# Patient Record
Sex: FEMALE | Race: WHITE | NOT HISPANIC OR LATINO | Employment: FULL TIME | URBAN - METROPOLITAN AREA
[De-identification: names, ages, dates, MRNs, and addresses within clinical notes are randomized per-mention and may not be internally consistent; named-entity substitution may affect disease eponyms.]

---

## 2018-05-09 ENCOUNTER — OFFICE VISIT (OUTPATIENT)
Dept: SLEEP MEDICINE | Facility: CLINIC | Age: 28
End: 2018-05-09
Payer: COMMERCIAL

## 2018-05-09 VITALS
HEIGHT: 66 IN | WEIGHT: 137 LBS | OXYGEN SATURATION: 97 % | RESPIRATION RATE: 16 BRPM | BODY MASS INDEX: 22.02 KG/M2 | HEART RATE: 71 BPM | DIASTOLIC BLOOD PRESSURE: 75 MMHG | SYSTOLIC BLOOD PRESSURE: 114 MMHG

## 2018-05-09 DIAGNOSIS — R06.83 SNORING: ICD-10-CM

## 2018-05-09 DIAGNOSIS — R06.81 WITNESSED APNEIC SPELLS: ICD-10-CM

## 2018-05-09 DIAGNOSIS — R29.818 SUSPECTED SLEEP APNEA: Primary | ICD-10-CM

## 2018-05-09 PROCEDURE — 99203 OFFICE O/P NEW LOW 30 MIN: CPT | Performed by: INTERNAL MEDICINE

## 2018-05-09 NOTE — PROGRESS NOTES
Visit Date:   05/09/2018      SLEEP EVALUATION       CHIEF COMPLAINT:  Snoring, witnessed apneas.      HISTORY OF PRESENT ILLNESS:  Ms. Christianson is a 27-year-old female who presents to clinic today with concerns about possible sleep apnea.  She has a long history of snoring.  However, in the last 6 months, her boyfriend of last 7 years has commented on stopping of breathing episodes that he has witnessed in her sleep.  Apparently on 1 night, he noticed 3 consecutive episodes in a short period of time, which made him significantly concerned.  He woke her up from sleep to terminate these episodes.  The patient herself has not been aware of apneas.  She has occasionally experienced a snort arousal but denies being aware of gasping or choking events.  She snores every day and snoring is reported to be loud.  They never sleep separately.  She complains of having frequent dry mouth.  She denies having significant difficulty breathing through her nose.  She denies having morning headaches.  She sleeps in all positions including her back and her sides.      The patient goes to bed between 10:00 and 11:00 p.m.  She can fall asleep within 10 minutes.  She estimates waking up once in the night for uncertain reasons or to have a drink of water.  She can usually return to sleep within 5 minutes.  In the morning, she wakes up at 7:00 a.m.  She denies having significant tiredness at this time.  On weekends, the patient goes to bed by 11:00 p.m. to midnight.  She may wake up between 9:00 and 10:00 a.m.  On an average, she manages 8 hours of sleep.      The patient denies having significant daytime sleepiness or fatigue.  She rated her [place] Sleepiness Scale Score at 1/24.  She denies having any significant sleepiness while driving.      The patient denies having restless leg symptoms.  On one occasion, she was seen to walk to the TV in her sleep and try to switch it on.  There are no other episodes of dream enactment or  parasomnias.      The patient drinks 2 cups of coffee in the morning.  She drinks alcohol fairly regularly, about 2-3 drinks in a night.  She has smoked since age 12 and averages 3/4 of a pack daily.      PAST MEDICAL HISTORY:  Nothing significant.      FAMILY HISTORY:  Both her parents snore loudly.      SOCIAL HISTORY:  She works as a .  She lives with her fiance.  She has a history of smoking since age 12.      REVIEW OF SYSTEMS:  A complete review of systems was negative.      PHYSICAL EXAMINATION:   CONSTITUTIONAL:  Awake, alert, cooperative, in no apparent distress.   EYES:  No icterus, normal conjunctivae, PERRLA.   ENT:  Oropharynx is Elliott class IV with elongated soft palate and narrow diameters.   CARDIOVASCULAR:  Regular S1 and S2.   PULMONARY:  Chest is symmetric.  Lungs clear bilaterally.   NEUROLOGIC:  Alert and oriented x3, no focal neurological deficit.  Cranial nerves are grossly normal exam.   PSYCHIATRIC:  Mood is euthymic with a congruent affect.  Attention and concentration are normal.      ASSESSMENT:   1.  Rule out obstructive sleep apnea.   2.  Snoring.   3.  Witnessed apneas.      The patient is a 27-year-old female with a BMI of 22 and neck circumference of 33 cm, who has become concerned about sleep apnea since her boyfriend witnessed apneas in her sleep.  She has a crowded oropharynx and has a history of frequent snoring.  She denies other subjective complaints regarding her sleep quality or daytime function.  History of witnessed apneas is concerning and raises the possibility of sleep disordered breathing.  However, pretest probability for moderate or severe sleep apnea is low.  I have recommended overnight sleep testing to assess for sleep disordered breathing and its severity.  Considering low pretest probability for moderate or severe sleep apnea, an in-lab polysomnogram will be the ideal option.      TREATMENT PLAN:   1.  Split night polysomnogram for evaluation  of sleep apnea.   2.  Follow up after sleep study for further recommendations.      I spent a total of 30 minutes with this patient, with more than 50% spent in counseling.         REGINO KOEHLER MD             D: 2018   T: 2018   MT: OSEI      Name:     ASHLEY GILLILAND   MRN:      [id number]        Account:      [id number]   :      1990           Visit Date:   2018      Document: [id number]

## 2018-05-09 NOTE — MR AVS SNAPSHOT
After Visit Summary   5/9/2018    Maxine Christianson    MRN: [id number]           Patient Information     Date Of Birth          1990        Visit Information        Provider Department      5/9/2018 2:00 PM Salazar Faust MD [facility]        Today's Diagnoses     Suspected sleep apnea    -  1    Snoring        Witnessed apneic spells          Care Instructions      Your BMI is Body mass index is 22.11 kg/(m^2).  Weight management is a personal decision.  If you are interested in exploring weight loss strategies, the following discussion covers the approaches that may be successful. Body mass index (BMI) is one way to tell whether you are at a healthy weight, overweight, or obese. It measures your weight in relation to your height.  A BMI of 18.5 to 24.9 is in the healthy range. A person with a BMI of 25 to 29.9 is considered overweight, and someone with a BMI of 30 or greater is considered obese. More than two-thirds of American adults are considered overweight or obese.  Being overweight or obese increases the risk for further weight gain. Excess weight may lead to heart disease and diabetes.  Creating and following plans for healthy eating and physical activity may help you improve your health.  Weight control is part of healthy lifestyle and includes exercise, emotional health, and healthy eating habits. Careful eating habits lifelong are the mainstay of weight control. Though there are significant health benefits from weight loss, long-term weight loss with diet alone may be very difficult to achieve- studies show long-term success with dietary management in less than 10% of people. Attaining a healthy weight may be especially difficult to achieve in those with severe obesity. In some cases, medications, devices and surgical management might be considered.  What can you do?  If you are overweight or obese and are interested in methods for weight loss, you should discuss  this with your provider.     Consider reducing daily calorie intake by 500 calories.     Keep a food journal.     Avoiding skipping meals, consider cutting portions instead.    Diet combined with exercise helps maintain muscle while optimizing fat loss. Strength training is particularly important for building and maintaining muscle mass. Exercise helps reduce stress, increase energy, and improves fitness. Increasing exercise without diet control, however, may not burn enough calories to loose weight.       Start walking three days a week 10-20 minutes at a time    Work towards walking thirty minutes five days a week     Eventually, increase the speed of your walking for 1-2 minutes at time    In addition, we recommend that you review healthy lifestyles and methods for weight loss available through the National Institutes of Health patient information sites:  http://win.niddk.nih.gov/publications/index.htm    And look into health and wellness programs that may be available through your health insurance provider, employer, local community center, or stef club.                  Follow-ups after your visit        Future tests that were ordered for you today     Open Future Orders        Priority Expected Expires Ordered    Comprehensive Sleep Study Routine  11/5/2018 5/9/2018            Who to contact     If you have questions or need follow up information about today's clinic visit or your schedule please contact [facility] directly at [phone].  Normal or non-critical lab and imaging results will be communicated to you by MyChart, letter or phone within 4 business days after the clinic has received the results. If you do not hear from us within 7 days, please contact the clinic through MyChart or phone. If you have a critical or abnormal lab result, we will notify you by phone as soon as possible.  Submit refill requests through [employer] or call your pharmacy and they will forward the refill  "request to us. Please allow 3 business days for your refill to be completed.          Additional Information About Your Visit        MyChart Information     [facility] lets you send messages to your doctor, view your test results, renew your prescriptions, schedule appointments and more. To sign up, go to [email or web address] beds [facility] the [email or web address] . Click on \"Log in\" on the left side of the screen, which will take you to the Welcome page. Then click on \"Sign up Now\" on the right side of the page.     You will be asked to enter the access code listed below, as well as some personal information. Please follow the directions to create your username and password.     Your access code is: [email or web address]  Expires: 2018  2:27 PM     Your access code will  in 90 days. If you need help or a new code, please call your [place] clinic or [phone].        Care EveryWhere ID     This is your Care EveryWhere ID. This could be used by other organizations to access your [place] medical records  [id number]        Your Vitals Were     Pulse Respirations Height Pulse Oximetry BMI (Body Mass Index)       71 16 1.676 m (5' 6\") 97% 22.11 kg/m2        Blood Pressure from Last 3 Encounters:   18 114/75    Weight from Last 3 Encounters:   18 62.1 kg (137 lb)               Primary Care Provider Fax #    Physician No Ref-Primary [phone]       No address on file        Equal Access to Services     DINA SANTOS : Hadii madison cruzo Soedilali, waaxda luqadaha, qaybta kaalmada adeegyada, carlos cline. So [facility] [phone].    ATENCIÓN: Si habla español, tiene a pino disposición servicios gratuitos de asistencia lingüística. Llame al [phone].    We comply with applicable federal civil rights laws and [place] laws. We do not discriminate on the basis of race, color, national origin, age, disability, sex, sexual orientation, or gender identity.            Thank you!     Thank you for choosing [facility] (acquired by [employer] in 2014) " [facility]  for your care. Our goal is always to provide you with excellent care. Hearing back from our patients is one way we can continue to improve our services. Please take a few minutes to complete the written survey that you may receive in the mail after your visit with us. Thank you!             Your Updated Medication List - Protect others around you: Learn how to safely use, store and throw away your medicines at www.disposemymeds.org.          This list is accurate as of 5/9/18  2:27 PM.  Always use your most recent med list.                   Brand Name Dispense Instructions for use Diagnosis    SPIRONOLACTONE PO      Take by mouth daily

## 2024-05-24 ENCOUNTER — HOSPITAL ENCOUNTER (OUTPATIENT)
Facility: CLINIC | Age: 34
Setting detail: OBSERVATION
Discharge: HOME | End: 2024-05-26
Attending: STUDENT IN AN ORGANIZED HEALTH CARE EDUCATION/TRAINING PROGRAM | Admitting: STUDENT IN AN ORGANIZED HEALTH CARE EDUCATION/TRAINING PROGRAM
Payer: COMMERCIAL

## 2024-05-24 ENCOUNTER — APPOINTMENT (OUTPATIENT)
Dept: ULTRASOUND IMAGING | Facility: CLINIC | Age: 34
End: 2024-05-24
Attending: STUDENT IN AN ORGANIZED HEALTH CARE EDUCATION/TRAINING PROGRAM
Payer: COMMERCIAL

## 2024-05-24 ENCOUNTER — APPOINTMENT (OUTPATIENT)
Dept: CT IMAGING | Facility: CLINIC | Age: 34
End: 2024-05-24
Attending: STUDENT IN AN ORGANIZED HEALTH CARE EDUCATION/TRAINING PROGRAM
Payer: COMMERCIAL

## 2024-05-24 DIAGNOSIS — F10.930 ALCOHOL WITHDRAWAL SYNDROME WITHOUT COMPLICATION (H): ICD-10-CM

## 2024-05-24 DIAGNOSIS — R74.8 ALKALINE PHOSPHATASE ELEVATION: ICD-10-CM

## 2024-05-24 DIAGNOSIS — F10.230 ALCOHOL DEPENDENCE WITH UNCOMPLICATED WITHDRAWAL (H): ICD-10-CM

## 2024-05-24 DIAGNOSIS — E87.29 STARVATION KETOACIDOSIS: ICD-10-CM

## 2024-05-24 DIAGNOSIS — D72.829 LEUKOCYTOSIS, UNSPECIFIED TYPE: ICD-10-CM

## 2024-05-24 DIAGNOSIS — E87.29 ALCOHOLIC KETOACIDOSIS: ICD-10-CM

## 2024-05-24 DIAGNOSIS — E80.6 HYPERBILIRUBINEMIA: ICD-10-CM

## 2024-05-24 DIAGNOSIS — K76.0 HEPATIC STEATOSIS: ICD-10-CM

## 2024-05-24 DIAGNOSIS — F41.9 ANXIETY: Primary | ICD-10-CM

## 2024-05-24 DIAGNOSIS — R74.01 TRANSAMINITIS: ICD-10-CM

## 2024-05-24 DIAGNOSIS — T73.0XXA STARVATION KETOACIDOSIS: ICD-10-CM

## 2024-05-24 DIAGNOSIS — E80.7 DISORDER OF BILIRUBIN METABOLISM, UNSPECIFIED: ICD-10-CM

## 2024-05-24 DIAGNOSIS — F10.20 SEVERE ALCOHOL USE DISORDER (H): ICD-10-CM

## 2024-05-24 LAB
ALBUMIN SERPL BCG-MCNC: 4.4 G/DL (ref 3.5–5.2)
ALBUMIN UR-MCNC: 100 MG/DL
ALP SERPL-CCNC: 196 U/L (ref 40–150)
ALT SERPL W P-5'-P-CCNC: 100 U/L (ref 0–50)
AMPHETAMINES UR QL SCN: ABNORMAL
ANION GAP SERPL CALCULATED.3IONS-SCNC: 17 MMOL/L (ref 7–15)
APPEARANCE UR: ABNORMAL
APTT PPP: 34 SECONDS (ref 22–38)
AST SERPL W P-5'-P-CCNC: 277 U/L (ref 0–45)
B-OH-BUTYR SERPL-SCNC: 1.3 MMOL/L
BARBITURATES UR QL SCN: ABNORMAL
BASE EXCESS BLDV CALC-SCNC: 3.2 MMOL/L (ref -3–3)
BASOPHILS # BLD AUTO: 0.1 10E3/UL (ref 0–0.2)
BASOPHILS NFR BLD AUTO: 1 %
BENZODIAZ UR QL SCN: ABNORMAL
BILIRUB DIRECT SERPL-MCNC: 1.54 MG/DL (ref 0–0.3)
BILIRUB SERPL-MCNC: 2.8 MG/DL
BILIRUB UR QL STRIP: ABNORMAL
BUN SERPL-MCNC: 7.7 MG/DL (ref 6–20)
BZE UR QL SCN: ABNORMAL
CALCIUM SERPL-MCNC: 10 MG/DL (ref 8.6–10)
CANNABINOIDS UR QL SCN: ABNORMAL
CHLORIDE SERPL-SCNC: 93 MMOL/L (ref 98–107)
COLOR UR AUTO: ABNORMAL
CREAT SERPL-MCNC: 0.61 MG/DL (ref 0.51–0.95)
D DIMER PPP FEU-MCNC: 0.36 UG/ML FEU (ref 0–0.5)
DEPRECATED HCO3 PLAS-SCNC: 24 MMOL/L (ref 22–29)
EGFRCR SERPLBLD CKD-EPI 2021: >90 ML/MIN/1.73M2
EOSINOPHIL # BLD AUTO: 0.1 10E3/UL (ref 0–0.7)
EOSINOPHIL NFR BLD AUTO: 1 %
ERYTHROCYTE [DISTWIDTH] IN BLOOD BY AUTOMATED COUNT: 14.1 % (ref 10–15)
ETHANOL SERPL-MCNC: <0.01 G/DL
FENTANYL UR QL: ABNORMAL
GLUCOSE SERPL-MCNC: 110 MG/DL (ref 70–99)
GLUCOSE UR STRIP-MCNC: NEGATIVE MG/DL
HCG UR QL: NEGATIVE
HCG UR QL: NEGATIVE
HCO3 BLDV-SCNC: 28 MMOL/L (ref 21–28)
HCT VFR BLD AUTO: 43.5 % (ref 35–47)
HGB BLD-MCNC: 15.2 G/DL (ref 11.7–15.7)
HGB UR QL STRIP: NEGATIVE
IMM GRANULOCYTES # BLD: 0.1 10E3/UL
IMM GRANULOCYTES NFR BLD: 1 %
INR PPP: 1.02 (ref 0.85–1.15)
INTERNAL QC OK POCT: NORMAL
KETONES UR STRIP-MCNC: 10 MG/DL
LACTATE SERPL-SCNC: 1.6 MMOL/L (ref 0.7–2)
LEUKOCYTE ESTERASE UR QL STRIP: NEGATIVE
LIPASE SERPL-CCNC: 35 U/L (ref 13–60)
LYMPHOCYTES # BLD AUTO: 1.7 10E3/UL (ref 0.8–5.3)
LYMPHOCYTES NFR BLD AUTO: 10 %
MAGNESIUM SERPL-MCNC: 2 MG/DL (ref 1.7–2.3)
MCH RBC QN AUTO: 40.1 PG (ref 26.5–33)
MCHC RBC AUTO-ENTMCNC: 34.9 G/DL (ref 31.5–36.5)
MCV RBC AUTO: 115 FL (ref 78–100)
MONOCYTES # BLD AUTO: 0.8 10E3/UL (ref 0–1.3)
MONOCYTES NFR BLD AUTO: 5 %
MUCOUS THREADS #/AREA URNS LPF: PRESENT /LPF
NEUTROPHILS # BLD AUTO: 13.2 10E3/UL (ref 1.6–8.3)
NEUTROPHILS NFR BLD AUTO: 82 %
NITRATE UR QL: NEGATIVE
NRBC # BLD AUTO: 0 10E3/UL
NRBC BLD AUTO-RTO: 0 /100
O2/TOTAL GAS SETTING VFR VENT: 96 %
OPIATES UR QL SCN: ABNORMAL
OXYHGB MFR BLDV: 41 % (ref 70–75)
PCO2 BLDV: 42 MM HG (ref 40–50)
PCP QUAL URINE (ROCHE): ABNORMAL
PH BLDV: 7.43 [PH] (ref 7.32–7.43)
PH UR STRIP: 6.5 [PH] (ref 5–7)
PHOSPHATE SERPL-MCNC: 2.7 MG/DL (ref 2.5–4.5)
PLATELET # BLD AUTO: 351 10E3/UL (ref 150–450)
PO2 BLDV: 25 MM HG (ref 25–47)
POCT KIT EXPIRATION DATE: NORMAL
POCT KIT LOT NUMBER: NORMAL
POTASSIUM SERPL-SCNC: 4.3 MMOL/L (ref 3.4–5.3)
PROCALCITONIN SERPL IA-MCNC: 0.32 NG/ML
PROT SERPL-MCNC: 8.1 G/DL (ref 6.4–8.3)
RBC # BLD AUTO: 3.79 10E6/UL (ref 3.8–5.2)
RBC URINE: 3 /HPF
SAO2 % BLDV: 43.2 % (ref 70–75)
SODIUM SERPL-SCNC: 134 MMOL/L (ref 135–145)
SP GR UR STRIP: 1.03 (ref 1–1.03)
SQUAMOUS EPITHELIAL: 7 /HPF
TROPONIN T SERPL HS-MCNC: <6 NG/L
UROBILINOGEN UR STRIP-MCNC: 2 MG/DL
WBC # BLD AUTO: 15.9 10E3/UL (ref 4–11)
WBC URINE: 6 /HPF

## 2024-05-24 PROCEDURE — 84100 ASSAY OF PHOSPHORUS: CPT | Performed by: STUDENT IN AN ORGANIZED HEALTH CARE EDUCATION/TRAINING PROGRAM

## 2024-05-24 PROCEDURE — 76705 ECHO EXAM OF ABDOMEN: CPT

## 2024-05-24 PROCEDURE — 93010 ELECTROCARDIOGRAM REPORT: CPT | Performed by: STUDENT IN AN ORGANIZED HEALTH CARE EDUCATION/TRAINING PROGRAM

## 2024-05-24 PROCEDURE — S5010 5% DEXTROSE AND 0.45% SALINE: HCPCS | Performed by: STUDENT IN AN ORGANIZED HEALTH CARE EDUCATION/TRAINING PROGRAM

## 2024-05-24 PROCEDURE — 84134 ASSAY OF PREALBUMIN: CPT | Performed by: STUDENT IN AN ORGANIZED HEALTH CARE EDUCATION/TRAINING PROGRAM

## 2024-05-24 PROCEDURE — 96375 TX/PRO/DX INJ NEW DRUG ADDON: CPT | Performed by: STUDENT IN AN ORGANIZED HEALTH CARE EDUCATION/TRAINING PROGRAM

## 2024-05-24 PROCEDURE — 85379 FIBRIN DEGRADATION QUANT: CPT | Performed by: STUDENT IN AN ORGANIZED HEALTH CARE EDUCATION/TRAINING PROGRAM

## 2024-05-24 PROCEDURE — 120N000002 HC R&B MED SURG/OB UMMC

## 2024-05-24 PROCEDURE — 85730 THROMBOPLASTIN TIME PARTIAL: CPT | Performed by: STUDENT IN AN ORGANIZED HEALTH CARE EDUCATION/TRAINING PROGRAM

## 2024-05-24 PROCEDURE — 99285 EMERGENCY DEPT VISIT HI MDM: CPT | Performed by: STUDENT IN AN ORGANIZED HEALTH CARE EDUCATION/TRAINING PROGRAM

## 2024-05-24 PROCEDURE — 85610 PROTHROMBIN TIME: CPT | Performed by: STUDENT IN AN ORGANIZED HEALTH CARE EDUCATION/TRAINING PROGRAM

## 2024-05-24 PROCEDURE — 83735 ASSAY OF MAGNESIUM: CPT | Performed by: STUDENT IN AN ORGANIZED HEALTH CARE EDUCATION/TRAINING PROGRAM

## 2024-05-24 PROCEDURE — 36415 COLL VENOUS BLD VENIPUNCTURE: CPT | Performed by: STUDENT IN AN ORGANIZED HEALTH CARE EDUCATION/TRAINING PROGRAM

## 2024-05-24 PROCEDURE — 99207 PR APP CREDIT; MD BILLING SHARED VISIT: CPT | Mod: FS | Performed by: PHYSICIAN ASSISTANT

## 2024-05-24 PROCEDURE — 85025 COMPLETE CBC W/AUTO DIFF WBC: CPT | Performed by: STUDENT IN AN ORGANIZED HEALTH CARE EDUCATION/TRAINING PROGRAM

## 2024-05-24 PROCEDURE — 81025 URINE PREGNANCY TEST: CPT | Performed by: STUDENT IN AN ORGANIZED HEALTH CARE EDUCATION/TRAINING PROGRAM

## 2024-05-24 PROCEDURE — 80307 DRUG TEST PRSMV CHEM ANLYZR: CPT | Performed by: STUDENT IN AN ORGANIZED HEALTH CARE EDUCATION/TRAINING PROGRAM

## 2024-05-24 PROCEDURE — 82010 KETONE BODYS QUAN: CPT | Performed by: STUDENT IN AN ORGANIZED HEALTH CARE EDUCATION/TRAINING PROGRAM

## 2024-05-24 PROCEDURE — 82805 BLOOD GASES W/O2 SATURATION: CPT | Performed by: STUDENT IN AN ORGANIZED HEALTH CARE EDUCATION/TRAINING PROGRAM

## 2024-05-24 PROCEDURE — 99285 EMERGENCY DEPT VISIT HI MDM: CPT | Mod: 25 | Performed by: STUDENT IN AN ORGANIZED HEALTH CARE EDUCATION/TRAINING PROGRAM

## 2024-05-24 PROCEDURE — 84145 PROCALCITONIN (PCT): CPT | Performed by: STUDENT IN AN ORGANIZED HEALTH CARE EDUCATION/TRAINING PROGRAM

## 2024-05-24 PROCEDURE — 250N000009 HC RX 250: Performed by: STUDENT IN AN ORGANIZED HEALTH CARE EDUCATION/TRAINING PROGRAM

## 2024-05-24 PROCEDURE — 250N000013 HC RX MED GY IP 250 OP 250 PS 637: Performed by: STUDENT IN AN ORGANIZED HEALTH CARE EDUCATION/TRAINING PROGRAM

## 2024-05-24 PROCEDURE — 250N000011 HC RX IP 250 OP 636: Performed by: STUDENT IN AN ORGANIZED HEALTH CARE EDUCATION/TRAINING PROGRAM

## 2024-05-24 PROCEDURE — 82077 ASSAY SPEC XCP UR&BREATH IA: CPT | Performed by: STUDENT IN AN ORGANIZED HEALTH CARE EDUCATION/TRAINING PROGRAM

## 2024-05-24 PROCEDURE — 83605 ASSAY OF LACTIC ACID: CPT | Performed by: STUDENT IN AN ORGANIZED HEALTH CARE EDUCATION/TRAINING PROGRAM

## 2024-05-24 PROCEDURE — 74177 CT ABD & PELVIS W/CONTRAST: CPT

## 2024-05-24 PROCEDURE — 84484 ASSAY OF TROPONIN QUANT: CPT | Performed by: STUDENT IN AN ORGANIZED HEALTH CARE EDUCATION/TRAINING PROGRAM

## 2024-05-24 PROCEDURE — 258N000003 HC RX IP 258 OP 636: Performed by: STUDENT IN AN ORGANIZED HEALTH CARE EDUCATION/TRAINING PROGRAM

## 2024-05-24 PROCEDURE — 93005 ELECTROCARDIOGRAM TRACING: CPT | Performed by: STUDENT IN AN ORGANIZED HEALTH CARE EDUCATION/TRAINING PROGRAM

## 2024-05-24 PROCEDURE — 80053 COMPREHEN METABOLIC PANEL: CPT | Performed by: STUDENT IN AN ORGANIZED HEALTH CARE EDUCATION/TRAINING PROGRAM

## 2024-05-24 PROCEDURE — 81001 URINALYSIS AUTO W/SCOPE: CPT | Performed by: STUDENT IN AN ORGANIZED HEALTH CARE EDUCATION/TRAINING PROGRAM

## 2024-05-24 PROCEDURE — 87040 BLOOD CULTURE FOR BACTERIA: CPT | Performed by: STUDENT IN AN ORGANIZED HEALTH CARE EDUCATION/TRAINING PROGRAM

## 2024-05-24 PROCEDURE — 83690 ASSAY OF LIPASE: CPT | Performed by: STUDENT IN AN ORGANIZED HEALTH CARE EDUCATION/TRAINING PROGRAM

## 2024-05-24 PROCEDURE — 99222 1ST HOSP IP/OBS MODERATE 55: CPT | Mod: FS | Performed by: INTERNAL MEDICINE

## 2024-05-24 PROCEDURE — 96365 THER/PROPH/DIAG IV INF INIT: CPT | Mod: 59 | Performed by: STUDENT IN AN ORGANIZED HEALTH CARE EDUCATION/TRAINING PROGRAM

## 2024-05-24 RX ORDER — ONDANSETRON 4 MG/1
4 TABLET, ORALLY DISINTEGRATING ORAL EVERY 6 HOURS PRN
Status: DISCONTINUED | OUTPATIENT
Start: 2024-05-24 | End: 2024-05-26 | Stop reason: HOSPADM

## 2024-05-24 RX ORDER — AMOXICILLIN 250 MG
2 CAPSULE ORAL 2 TIMES DAILY PRN
Status: DISCONTINUED | OUTPATIENT
Start: 2024-05-24 | End: 2024-05-26 | Stop reason: HOSPADM

## 2024-05-24 RX ORDER — DEXAMETHASONE 4 MG/1
1 TABLET ORAL 2 TIMES DAILY
COMMUNITY
Start: 2023-10-23 | End: 2024-05-24

## 2024-05-24 RX ORDER — MULTIPLE VITAMINS W/ MINERALS TAB 9MG-400MCG
1 TAB ORAL DAILY
Status: DISCONTINUED | OUTPATIENT
Start: 2024-05-25 | End: 2024-05-26 | Stop reason: HOSPADM

## 2024-05-24 RX ORDER — ALBUTEROL SULFATE 90 UG/1
2 AEROSOL, METERED RESPIRATORY (INHALATION) 4 TIMES DAILY
Status: DISCONTINUED | OUTPATIENT
Start: 2024-05-25 | End: 2024-05-26 | Stop reason: HOSPADM

## 2024-05-24 RX ORDER — THIAMINE HYDROCHLORIDE 100 MG/ML
100 INJECTION, SOLUTION INTRAMUSCULAR; INTRAVENOUS ONCE
Status: COMPLETED | OUTPATIENT
Start: 2024-05-24 | End: 2024-05-24

## 2024-05-24 RX ORDER — HALOPERIDOL 5 MG/ML
2.5-5 INJECTION INTRAMUSCULAR EVERY 6 HOURS PRN
Status: DISCONTINUED | OUTPATIENT
Start: 2024-05-24 | End: 2024-05-26 | Stop reason: HOSPADM

## 2024-05-24 RX ORDER — DIAZEPAM 10 MG
10 TABLET ORAL ONCE
Status: COMPLETED | OUTPATIENT
Start: 2024-05-24 | End: 2024-05-24

## 2024-05-24 RX ORDER — ACETAMINOPHEN 325 MG/1
325 TABLET ORAL EVERY 6 HOURS PRN
Status: DISCONTINUED | OUTPATIENT
Start: 2024-05-24 | End: 2024-05-26 | Stop reason: HOSPADM

## 2024-05-24 RX ORDER — FOLIC ACID 1 MG/1
1 TABLET ORAL ONCE
Status: COMPLETED | OUTPATIENT
Start: 2024-05-24 | End: 2024-05-24

## 2024-05-24 RX ORDER — DIAZEPAM 5 MG
5-20 TABLET ORAL EVERY 30 MIN PRN
Status: DISCONTINUED | OUTPATIENT
Start: 2024-05-24 | End: 2024-05-25

## 2024-05-24 RX ORDER — LORAZEPAM 1 MG/1
1-2 TABLET ORAL EVERY 30 MIN PRN
Status: DISCONTINUED | OUTPATIENT
Start: 2024-05-24 | End: 2024-05-26 | Stop reason: HOSPADM

## 2024-05-24 RX ORDER — LORAZEPAM 2 MG/ML
1-2 INJECTION INTRAMUSCULAR EVERY 30 MIN PRN
Status: DISCONTINUED | OUTPATIENT
Start: 2024-05-24 | End: 2024-05-26 | Stop reason: HOSPADM

## 2024-05-24 RX ORDER — CALCIUM CARBONATE 500 MG/1
1000 TABLET, CHEWABLE ORAL 4 TIMES DAILY PRN
Status: DISCONTINUED | OUTPATIENT
Start: 2024-05-24 | End: 2024-05-26 | Stop reason: HOSPADM

## 2024-05-24 RX ORDER — FAMOTIDINE 20 MG/1
20 TABLET, FILM COATED ORAL 2 TIMES DAILY
Status: DISCONTINUED | OUTPATIENT
Start: 2024-05-25 | End: 2024-05-26 | Stop reason: HOSPADM

## 2024-05-24 RX ORDER — PANTOPRAZOLE SODIUM 40 MG/1
40 TABLET, DELAYED RELEASE ORAL ONCE
Status: COMPLETED | OUTPATIENT
Start: 2024-05-24 | End: 2024-05-24

## 2024-05-24 RX ORDER — ALBUTEROL SULFATE 90 UG/1
2 AEROSOL, METERED RESPIRATORY (INHALATION) 4 TIMES DAILY
COMMUNITY
Start: 2024-03-18

## 2024-05-24 RX ORDER — AMOXICILLIN 250 MG
1 CAPSULE ORAL 2 TIMES DAILY PRN
Status: DISCONTINUED | OUTPATIENT
Start: 2024-05-24 | End: 2024-05-26 | Stop reason: HOSPADM

## 2024-05-24 RX ORDER — FLUTICASONE FUROATE AND VILANTEROL 100; 25 UG/1; UG/1
1 POWDER RESPIRATORY (INHALATION) DAILY
Status: DISCONTINUED | OUTPATIENT
Start: 2024-05-25 | End: 2024-05-26 | Stop reason: HOSPADM

## 2024-05-24 RX ORDER — FLUMAZENIL 0.1 MG/ML
0.2 INJECTION, SOLUTION INTRAVENOUS
Status: DISCONTINUED | OUTPATIENT
Start: 2024-05-24 | End: 2024-05-26 | Stop reason: HOSPADM

## 2024-05-24 RX ORDER — LIDOCAINE 40 MG/G
CREAM TOPICAL
Status: DISCONTINUED | OUTPATIENT
Start: 2024-05-24 | End: 2024-05-26 | Stop reason: HOSPADM

## 2024-05-24 RX ORDER — FOLIC ACID 1 MG/1
1 TABLET ORAL DAILY
Status: DISCONTINUED | OUTPATIENT
Start: 2024-05-25 | End: 2024-05-26 | Stop reason: HOSPADM

## 2024-05-24 RX ORDER — FLUTICASONE PROPIONATE AND SALMETEROL 250; 50 UG/1; UG/1
1 POWDER RESPIRATORY (INHALATION) 2 TIMES DAILY
COMMUNITY
Start: 2024-05-20

## 2024-05-24 RX ORDER — IOPAMIDOL 755 MG/ML
100 INJECTION, SOLUTION INTRAVASCULAR ONCE
Status: COMPLETED | OUTPATIENT
Start: 2024-05-24 | End: 2024-05-24

## 2024-05-24 RX ORDER — CLONIDINE HYDROCHLORIDE 0.1 MG/1
0.1 TABLET ORAL EVERY 8 HOURS
Status: DISCONTINUED | OUTPATIENT
Start: 2024-05-25 | End: 2024-05-26 | Stop reason: HOSPADM

## 2024-05-24 RX ORDER — OLANZAPINE 5 MG/1
5-10 TABLET, ORALLY DISINTEGRATING ORAL EVERY 6 HOURS PRN
Status: DISCONTINUED | OUTPATIENT
Start: 2024-05-24 | End: 2024-05-26 | Stop reason: HOSPADM

## 2024-05-24 RX ORDER — MULTIPLE VITAMINS W/ MINERALS TAB 9MG-400MCG
1 TAB ORAL DAILY
Status: DISCONTINUED | OUTPATIENT
Start: 2024-05-25 | End: 2024-05-25

## 2024-05-24 RX ORDER — ONDANSETRON 2 MG/ML
4 INJECTION INTRAMUSCULAR; INTRAVENOUS EVERY 6 HOURS PRN
Status: DISCONTINUED | OUTPATIENT
Start: 2024-05-24 | End: 2024-05-26 | Stop reason: HOSPADM

## 2024-05-24 RX ADMIN — DIAZEPAM 10 MG: 10 TABLET ORAL at 17:57

## 2024-05-24 RX ADMIN — THIAMINE HYDROCHLORIDE 100 MG: 100 INJECTION, SOLUTION INTRAMUSCULAR; INTRAVENOUS at 17:58

## 2024-05-24 RX ADMIN — FOLIC ACID: 5 INJECTION, SOLUTION INTRAMUSCULAR; INTRAVENOUS; SUBCUTANEOUS at 18:56

## 2024-05-24 RX ADMIN — PANTOPRAZOLE SODIUM 40 MG: 40 TABLET, DELAYED RELEASE ORAL at 18:55

## 2024-05-24 RX ADMIN — FOLIC ACID 1 MG: 1 TABLET ORAL at 17:58

## 2024-05-24 RX ADMIN — DIAZEPAM 10 MG: 5 TABLET ORAL at 20:19

## 2024-05-24 RX ADMIN — SODIUM CHLORIDE 36 ML: 9 INJECTION, SOLUTION INTRAVENOUS at 19:11

## 2024-05-24 RX ADMIN — IOPAMIDOL 74 ML: 755 INJECTION, SOLUTION INTRAVENOUS at 19:11

## 2024-05-24 ASSESSMENT — ACTIVITIES OF DAILY LIVING (ADL)
ADLS_ACUITY_SCORE: 35

## 2024-05-24 ASSESSMENT — COLUMBIA-SUICIDE SEVERITY RATING SCALE - C-SSRS
6. HAVE YOU EVER DONE ANYTHING, STARTED TO DO ANYTHING, OR PREPARED TO DO ANYTHING TO END YOUR LIFE?: NO
2. HAVE YOU ACTUALLY HAD ANY THOUGHTS OF KILLING YOURSELF IN THE PAST MONTH?: NO
1. IN THE PAST MONTH, HAVE YOU WISHED YOU WERE DEAD OR WISHED YOU COULD GO TO SLEEP AND NOT WAKE UP?: NO

## 2024-05-24 NOTE — ED TRIAGE NOTES
Patient has been having abdominal pain x 1 month,patient seen PCP labs showed elevated liver enzymes and recommended Abdominal CT.     Triage Assessment (Adult)       Row Name 05/24/24 5389          Triage Assessment    Airway WDL WDL        Respiratory WDL    Respiratory WDL WDL        Skin Circulation/Temperature WDL    Skin Circulation/Temperature WDL WDL        Cardiac WDL    Cardiac WDL WDL        Peripheral/Neurovascular WDL    Peripheral Neurovascular WDL WDL        Cognitive/Neuro/Behavioral WDL    Cognitive/Neuro/Behavioral WDL WDL

## 2024-05-24 NOTE — ED PROVIDER NOTES
[facility] EMERGENCY DEPARTMENT ([facility])    5/24/24      ED PROVIDER NOTE   ED 8  History     Chief Complaint   Patient presents with    Alcohol Problem     Here for detox from alcohol, drinks a pint of captain jimmy daily, last drink was at 11 pm last night     HPI  Maxine Armas is a 34 year old female with a past medical history of alcohol use disorder, syncopal episodes with convulsions (ruled out for epilepsy and not associated with alcohol withdrawal), asthma who presents to the Emergency Department with a 1 month history of abdominal pain, abnormal labs seeking assistance with alcohol detox.  She has been drinking a pint of Captain Jimmy daily, last drink at 11 PM last night.  She gets shakes and mild alcohol withdrawal but has no history of seizures or delirium tremens.  Will occasionally use cannabis but no other recreational substances.  Drinks at least 0.5 L of hard liquor daily with 2-3 white claws.    Regarding her abdominal pain she describes fullness and discomfort in the upper abdomen and abdominal bloating.  She has had nausea and vomiting.  She has a significantly decreased appetite.  No hematemesis no hematuria no abnormal bleeding or bruising reported.  No bladder or bowel concerns.    She had been seen by primary care who was alarmed by her abnormal labs and attempted to arrange outpatient CT which was unsuccessful due to insurance preauthorization issues.  She then went to an urgent care who thought that her abdominal issues could be worked up on an outpatient basis however if she did intend to start drinking they were very worried about significant alcohol withdrawal and recommended she present for evaluation for possible detox.    Labs from outside primary care clinic show mild hyperbilirubinemia and transaminitis, moderate alk phos elevation.  She also has a slight anemia and leukocytosis.    Past Medical History  Past Medical History:   Diagnosis Date    SENG (obstructive  "sleep apnea)     Substance abuse (H)      No past surgical history on file.  albuterol (PROAIR HFA/PROVENTIL HFA/VENTOLIN HFA) 108 (90 Base) MCG/ACT inhaler  WIXELA INHUB 250-50 MCG/ACT inhaler      No Known Allergies  Family History  No family history on file.  Social History   Social History     Tobacco Use    Smoking status: Every Day     Types: Cigarettes    Smokeless tobacco: Never   Substance Use Topics    Alcohol use: Yes     Comment: a pint daily    Drug use: Yes     Types: Marijuana      Past medical history, past surgical history, medications, allergies, family history, and social history were reviewed with the patient. No additional pertinent items.   A medically appropriate review of systems was performed with pertinent positives and negatives noted in the HPI, and all other systems negative.    Physical Exam   BP: 127/87  Pulse: 107  Temp: 98.5  F (36.9  C)  Resp: 16  Height: 167.6 cm (5' 6\")  Weight: 68 kg (150 lb)  SpO2: 96 %  Physical Exam  Vital Signs Reviewed  Gen: Well nourished, well developed, resting comfortably, no acute distress  HEENT: NC/AT, PERRL, EOMI, MMM  Neck: Supple, FROM  CV: Regular tachycardia  Lungs/Chest: Normal Effort  Abd: Distended, no significant tenderness.  No rigidity rebound or guarding   MSK/Back: FROM, no visible deformity  Neuro: A&Ox3, GCS 15, CN II-XII unremarkable. Strength and sensation globally intact.  Skin: Warm, Dry, Intact, no visible lesions     ED Course, Procedures, & Data      Procedures            EKG Interpretation:      Interpreted by Uday [facility] MD  Time reviewed: 1740  Symptoms at time of EKG: Abd Pain, Tachycardia   Rhythm: normal sinus   Rate: normal  Axis: normal  Ectopy: none  Conduction: normal  ST Segments/ T Waves: No ST-T wave changes  Q Waves: none  Comparison to prior: No old EKG available    Clinical Impression: normal EKG         Results for orders placed or performed during the hospital encounter of 05/24/24   US Abdomen Limited     " Status: None    Narrative    EXAM: US ABDOMEN LIMITED  LOCATION: [facility]  DATE: 5/24/2024    INDICATION: Hx ETOH use disorder, abdominal pain, transaminitis and hyperbilirubinemia  COMPARISON: None.  TECHNIQUE: Limited abdominal ultrasound.    FINDINGS:    GALLBLADDER: Normal. No gallstones, wall thickening, or pericholecystic fluid. Negative sonographic Grullon's sign.    BILE DUCTS: No biliary dilatation. The common duct measures 3 mm.    LIVER: Increased echogenicity from diffuse fatty infiltration. Hepatomegaly, no focal mass evident.    RIGHT KIDNEY: No hydronephrosis.    PANCREAS: The visualized portions are normal.    No ascites.      Impression    IMPRESSION:  1.  Hepatomegaly with diffuse fatty infiltration of liver.  2.  Gallbladder and bile ducts normal.       CT Abdomen Pelvis w Contrast     Status: None    Narrative    EXAM: CT ABDOMEN PELVIS W CONTRAST  LOCATION: [facility]  DATE: 5/24/2024    INDICATION: Abd pain, bloating, hyperbilirubinemia and transaminitis, hx EtOH use disorder  COMPARISON: None.  TECHNIQUE: CT scan of the abdomen and pelvis was performed following injection of IV contrast. Multiplanar reformats were obtained. Dose reduction techniques were used.  CONTRAST: 74mL Isovue 370    FINDINGS:   LOWER CHEST: Calcified granuloma left lower lobe.    HEPATOBILIARY: Hepatomegaly with significant fatty infiltration of the liver.    PANCREAS: Normal.    SPLEEN: Splenomegaly.    ADRENAL GLANDS: Normal.    KIDNEYS/BLADDER: Normal.    BOWEL: Normal.    LYMPH NODES: Normal.    VASCULATURE: Normal.    PELVIC ORGANS: Normal.    MUSCULOSKELETAL: Normal.      Impression    IMPRESSION:   1.  Hepatomegaly with significant fatty infiltration of liver. Splenomegaly    2.  Normal appendix.   HCG qualitative urine     Status: Normal   Result Value Ref Range    hCG Urine Qualitative Negative Negative   Basic  metabolic panel     Status: Abnormal   Result Value Ref Range    Sodium 134 (L) 135 - 145 mmol/L    Potassium 4.3 3.4 - 5.3 mmol/L    Chloride 93 (L) 98 - 107 mmol/L    Carbon Dioxide (CO2) 24 22 - 29 mmol/L    Anion Gap 17 (H) 7 - 15 mmol/L    Urea Nitrogen 7.7 6.0 - 20.0 mg/dL    Creatinine 0.61 0.51 - 0.95 mg/dL    GFR Estimate >90 >60 mL/min/1.73m2    Calcium 10.0 8.6 - 10.0 mg/dL    Glucose 110 (H) 70 - 99 mg/dL   Hepatic function panel     Status: Abnormal   Result Value Ref Range    Protein Total 8.1 6.4 - 8.3 g/dL    Albumin 4.4 3.5 - 5.2 g/dL    Bilirubin Total 2.8 (H) <=1.2 mg/dL    Alkaline Phosphatase 196 (H) 40 - 150 U/L     (H) 0 - 45 U/L     (H) 0 - 50 U/L    Bilirubin Direct 1.54 (H) 0.00 - 0.30 mg/dL   Lipase     Status: Normal   Result Value Ref Range    Lipase 35 13 - 60 U/L   Lactic acid whole blood with 1x repeat in 2 hr when >2     Status: Normal   Result Value Ref Range    Lactic Acid, Initial 1.6 0.7 - 2.0 mmol/L   D dimer quantitative     Status: Normal   Result Value Ref Range    D-Dimer Quantitative 0.36 0.00 - 0.50 ug/mL FEU    Narrative    This D-dimer assay is intended for use in conjunction with a clinical pretest probability assessment model to exclude pulmonary embolism (PE) and deep venous thrombosis (DVT) in outpatients suspected of PE or DVT. The cut-off value is 0.50 ug/mL FEU.   INR     Status: Normal   Result Value Ref Range    INR 1.02 0.85 - 1.15   Partial thromboplastin time     Status: Normal   Result Value Ref Range    aPTT 34 22 - 38 Seconds   Magnesium     Status: Normal   Result Value Ref Range    Magnesium 2.0 1.7 - 2.3 mg/dL   Troponin T, High Sensitivity     Status: Normal   Result Value Ref Range    Troponin T, High Sensitivity <6 <=14 ng/L   UA with Microscopic reflex to Culture     Status: Abnormal    Specimen: Urine, Clean Catch   Result Value Ref Range    Color Urine Orange (A) Colorless, Straw, Light Yellow, Yellow    Appearance Urine Slightly  Cloudy (A) Clear    Glucose Urine Negative Negative mg/dL    Bilirubin Urine Small (A) Negative    Ketones Urine 10 (A) Negative mg/dL    Specific Gravity Urine 1.031 1.003 - 1.035    Blood Urine Negative Negative    pH Urine 6.5 5.0 - 7.0    Protein Albumin Urine 100 (A) Negative mg/dL    Urobilinogen Urine 2.0 Normal, 2.0 mg/dL    Nitrite Urine Negative Negative    Leukocyte Esterase Urine Negative Negative    Mucus Urine Present (A) None Seen /LPF    RBC Urine 3 (H) <=2 /HPF    WBC Urine 6 (H) <=5 /HPF    Squamous Epithelials Urine 7 (H) <=1 /HPF    Narrative    Urine Culture not indicated   Phosphorus     Status: Normal   Result Value Ref Range    Phosphorus 2.7 2.5 - 4.5 mg/dL   CBC with platelets and differential     Status: Abnormal   Result Value Ref Range    WBC Count 15.9 (H) 4.0 - 11.0 10e3/uL    RBC Count 3.79 (L) 3.80 - 5.20 10e6/uL    Hemoglobin 15.2 11.7 - 15.7 g/dL    Hematocrit 43.5 35.0 - 47.0 %     (H) 78 - 100 fL    MCH 40.1 (H) 26.5 - 33.0 pg    MCHC 34.9 31.5 - 36.5 g/dL    RDW 14.1 10.0 - 15.0 %    Platelet Count 351 150 - 450 10e3/uL    % Neutrophils 82 %    % Lymphocytes 10 %    % Monocytes 5 %    % Eosinophils 1 %    % Basophils 1 %    % Immature Granulocytes 1 %    NRBCs per 100 WBC 0 <1 /100    Absolute Neutrophils 13.2 (H) 1.6 - 8.3 10e3/uL    Absolute Lymphocytes 1.7 0.8 - 5.3 10e3/uL    Absolute Monocytes 0.8 0.0 - 1.3 10e3/uL    Absolute Eosinophils 0.1 0.0 - 0.7 10e3/uL    Absolute Basophils 0.1 0.0 - 0.2 10e3/uL    Absolute Immature Granulocytes 0.1 <=0.4 10e3/uL    Absolute NRBCs 0.0 10e3/uL   Alcohol level blood     Status: Normal   Result Value Ref Range    Alcohol ethyl <0.01 <=0.01 g/dL   Blood gas venous     Status: Abnormal   Result Value Ref Range    pH Venous 7.43 7.32 - 7.43    pCO2 Venous 42 40 - 50 mm Hg    pO2 Venous 25 25 - 47 mm Hg    Bicarbonate Venous 28 21 - 28 mmol/L    Base Excess/Deficit Venous 3.2 (H) -3.0 - 3.0 mmol/L    FIO2 96     Oxyhemoglobin Venous  41 (L) 70 - 75 %    O2 Sat, Venous 43.2 (L) 70.0 - 75.0 %    Narrative    In healthy individuals, oxyhemoglobin (O2Hb) and oxygen saturation (SO2) are approximately equal. In the presence of dyshemoglobins, oxyhemoglobin can be considerably lower than oxygen saturation.   Ketone Beta-Hydroxybutyrate Quantitative,Rapid     Status: Abnormal   Result Value Ref Range    Ketone (Beta-Hydroxybutyrate) Quantitative 1.30 (H) <=0.30 mmol/L   Urine Drug Screen Panel     Status: Abnormal   Result Value Ref Range    Amphetamines Urine Screen Positive (A) Screen Negative    Barbituates Urine Screen Negative Screen Negative    Benzodiazepine Urine Screen Negative Screen Negative    Cannabinoids Urine Screen Positive (A) Screen Negative    Cocaine Urine Screen Negative Screen Negative    Fentanyl Qual Urine Screen Negative Screen Negative    Opiates Urine Screen Negative Screen Negative    PCP Urine Screen Negative Screen Negative   Procalcitonin     Status: Normal   Result Value Ref Range    Procalcitonin 0.32 <0.50 ng/mL   EKG 12-lead, tracing only     Status: None (Preliminary result)   Result Value Ref Range    Systolic Blood Pressure  mmHg    Diastolic Blood Pressure  mmHg    Ventricular Rate 93 BPM    Atrial Rate 93 BPM    CA Interval 134 ms    QRS Duration 80 ms     ms    QTc 457 ms    P Axis 47 degrees    R AXIS 16 degrees    T Axis 14 degrees    Interpretation ECG Sinus rhythm  Normal ECG      hCG qual urine POCT     Status: Normal   Result Value Ref Range    HCG Qual Urine Negative Negative    Internal QC Check POCT Valid Valid    POCT Kit Lot Number [id number]     POCT Kit Expiration Date 10/30/2025    Urine Drug Screen     Status: Abnormal    Narrative    The following orders were created for panel order Urine Drug Screen.  Procedure                               Abnormality         Status                     ---------                               -----------         ------                     Urine Drug Screen  Panel[[id number]]      Abnormal            Final result                 Please view results for these tests on the individual orders.   CBC with platelets differential     Status: Abnormal    Narrative    The following orders were created for panel order CBC with platelets differential.  Procedure                               Abnormality         Status                     ---------                               -----------         ------                     CBC with platelets and d...[[id number]]  Abnormal            Final result                 Please view results for these tests on the individual orders.     Medications   diazepam (VALIUM) tablet 5-20 mg (10 mg Oral $Given 5/24/24 2019)   multivitamin w/minerals (THERA-VIT-M) tablet 1 tablet (has no administration in time range)   diazepam (VALIUM) tablet 10 mg (10 mg Oral $Given 5/24/24 1757)   dextrose 5% and 0.45% NaCl 1,000 mL with Infuvite Adult 10 mL, thiamine 100 mg, folic acid 1 mg infusion ( Intravenous Stopped 5/24/24 1950)   thiamine (B-1) injection 100 mg (100 mg Intravenous $Given 5/24/24 1758)   folic acid (FOLVITE) tablet 1 mg (1 mg Oral $Given 5/24/24 1758)   pantoprazole (PROTONIX) EC tablet 40 mg (40 mg Oral $Given 5/24/24 1855)   iopamidol (ISOVUE-370) solution 100 mL (74 mLs Intravenous $Given 5/24/24 1911)   sodium chloride 0.9 % bag 100mL (36 mLs Intravenous $Given 5/24/24 1911)     Labs Ordered and Resulted from Time of ED Arrival to Time of ED Departure   BASIC METABOLIC PANEL - Abnormal       Result Value    Sodium 134 (*)     Potassium 4.3      Chloride 93 (*)     Carbon Dioxide (CO2) 24      Anion Gap 17 (*)     Urea Nitrogen 7.7      Creatinine 0.61      GFR Estimate >90      Calcium 10.0      Glucose 110 (*)    HEPATIC FUNCTION PANEL - Abnormal    Protein Total 8.1      Albumin 4.4      Bilirubin Total 2.8 (*)     Alkaline Phosphatase 196 (*)      (*)      (*)     Bilirubin Direct 1.54 (*)    ROUTINE UA WITH MICROSCOPIC  REFLEX TO CULTURE - Abnormal    Color Urine Orange (*)     Appearance Urine Slightly Cloudy (*)     Glucose Urine Negative      Bilirubin Urine Small (*)     Ketones Urine 10 (*)     Specific Gravity Urine 1.031      Blood Urine Negative      pH Urine 6.5      Protein Albumin Urine 100 (*)     Urobilinogen Urine 2.0      Nitrite Urine Negative      Leukocyte Esterase Urine Negative      Mucus Urine Present (*)     RBC Urine 3 (*)     WBC Urine 6 (*)     Squamous Epithelials Urine 7 (*)    CBC WITH PLATELETS AND DIFFERENTIAL - Abnormal    WBC Count 15.9 (*)     RBC Count 3.79 (*)     Hemoglobin 15.2      Hematocrit 43.5       (*)     MCH 40.1 (*)     MCHC 34.9      RDW 14.1      Platelet Count 351      % Neutrophils 82      % Lymphocytes 10      % Monocytes 5      % Eosinophils 1      % Basophils 1      % Immature Granulocytes 1      NRBCs per 100 WBC 0      Absolute Neutrophils 13.2 (*)     Absolute Lymphocytes 1.7      Absolute Monocytes 0.8      Absolute Eosinophils 0.1      Absolute Basophils 0.1      Absolute Immature Granulocytes 0.1      Absolute NRBCs 0.0     BLOOD GAS VENOUS - Abnormal    pH Venous 7.43      pCO2 Venous 42      pO2 Venous 25      Bicarbonate Venous 28      Base Excess/Deficit Venous 3.2 (*)     FIO2 96      Oxyhemoglobin Venous 41 (*)     O2 Sat, Venous 43.2 (*)    KETONE BETA-HYDROXYBUTYRATE QUANTITATIVE, RAPID - Abnormal    Ketone (Beta-Hydroxybutyrate) Quantitative 1.30 (*)    URINE DRUG SCREEN PANEL - Abnormal    Amphetamines Urine Screen Positive (*)     Barbituates Urine Screen Negative      Benzodiazepine Urine Screen Negative      Cannabinoids Urine Screen Positive (*)     Cocaine Urine Screen Negative      Fentanyl Qual Urine Screen Negative      Opiates Urine Screen Negative      PCP Urine Screen Negative     HCG QUALITATIVE URINE - Normal    hCG Urine Qualitative Negative     LIPASE - Normal    Lipase 35     LACTIC ACID WHOLE BLOOD WITH 1X REPEAT IN 2 HR WHEN >2 - Normal     Lactic Acid, Initial 1.6     D DIMER QUANTITATIVE - Normal    D-Dimer Quantitative 0.36     INR - Normal    INR 1.02     PARTIAL THROMBOPLASTIN TIME - Normal    aPTT 34     MAGNESIUM - Normal    Magnesium 2.0     TROPONIN T, HIGH SENSITIVITY - Normal    Troponin T, High Sensitivity <6     PHOSPHORUS - Normal    Phosphorus 2.7     ETHYL ALCOHOL LEVEL - Normal    Alcohol ethyl <0.01     PROCALCITONIN - Normal    Procalcitonin 0.32     HCG QUALITATIVE URINE POCT - Normal    HCG Qual Urine Negative      Internal QC Check POCT Valid      POCT Kit Lot Number [id number]      POCT Kit Expiration Date 10/30/2025     PREALBUMIN   BLOOD CULTURE   BLOOD CULTURE     CT Abdomen Pelvis w Contrast   Final Result   IMPRESSION:    1.  Hepatomegaly with significant fatty infiltration of liver. Splenomegaly      2.  Normal appendix.      US Abdomen Limited   Final Result   IMPRESSION:   1.  Hepatomegaly with diffuse fatty infiltration of liver.   2.  Gallbladder and bile ducts normal.                   Critical care was not performed.     Medical Decision Making  The patient's presentation was of high complexity (an acute health issue posing potential threat to life or bodily function).    The patient's evaluation involved:  ordering and/or review of 3+ test(s) in this encounter (see separate area of note for details)  discussion of management or test interpretation with another health professional (Hospitalist)    The patient's management necessitated high risk (a decision regarding hospitalization).    Assessment & Plan    Maxine Armas is a 34 year old female with a past medical history of alcohol use disorder, syncopal episodes with convulsions (ruled out for epilepsy and not associated with alcohol withdrawal), asthma who presents to the Emergency Department with a 1 month history of abdominal pain, abnormal labs seeking assistance with alcohol detox. On arrival she is exhibiting clinical signs of mild to moderate alcohol  withdrawal with some tremors tongue fasciculation and tachycardia.  IV fluids and antiemetics have been ordered as well as oral Valium and she was placed on the MSSA withdrawal protocol.    Will obtain abdominal imaging, starting with ultrasound and likely following with cross-sectional imaging.  I suspect given what appears to be a likely new diagnosis of alcoholic hepatitis versus alcoholic cirrhosis this patient would be better served with a medical admission for initial supervision of detox prior to going to a dedicated inpatient psychiatric run detox unit.    I am also suspicious that there may be some alcoholic gastritis, fortunately no signs of hemorrhage.  Patient receiving some PPI.    Patient's workup today was notable for a slightly elevated leukocytosis, there is no fever or other infectious symptoms.  No lactic acid elevation, slight elevation in procalcitonin but not critically elevated.  No ascites on imaging to suggest increased risk for SBP.  Given lack of other secondary infectious symptoms we will defer antibiotics at this point.  Continue to trend labs.  There is no bandemia, there is a possibility this leukocytosis is reactive in the setting of pain vomiting and stress.    Labs are also notable for what appears to most likely be a combination of some starvation and alcoholic ketoacidosis.  No overt pH derangement.  Electrolytes reassuring.    Patient's hepatobiliary labs are notable for an elevated bilirubin with transaminitis and alcoholic pattern.  Alk phos is also elevated.  Transaminitis and bilirubin seem slightly elevated above her outpatient labs.  Urine drug screen positive for cannabis which patient endorses.  Amphetamines which patient denies.  She is not raising any suspicion for withholding other substances and I suspect this may be related to cross-reactivity.  She did admit to use of a friend's Adderall over a month ago but nothing recently.    Patient's symptoms are improving with  treatment of alcohol withdrawal.  She was excepted for admission to the general medicine service, will defer subspecialty consults to the primary admitting team if they feel any are necessary.  She appears stable for admission at this time.    New Prescriptions    No medications on file       Final diagnoses:   Severe alcohol use disorder (H)   Alcohol withdrawal syndrome without complication (H)   Hepatic steatosis   Alcoholic ketoacidosis   Starvation ketoacidosis   Leukocytosis, unspecified type   Transaminitis   Hyperbilirubinemia   Alkaline phosphatase elevation     Uday Jackson Jr., MD   [facility] EMERGENCY DEPARTMENT  5/24/2024        Uday Jackson MD  05/24/24 2020

## 2024-05-25 LAB
ALBUMIN SERPL BCG-MCNC: 4 G/DL (ref 3.5–5.2)
ALP SERPL-CCNC: 171 U/L (ref 40–150)
ALT SERPL W P-5'-P-CCNC: 83 U/L (ref 0–50)
ANION GAP SERPL CALCULATED.3IONS-SCNC: 15 MMOL/L (ref 7–15)
AST SERPL W P-5'-P-CCNC: 234 U/L (ref 0–45)
ATRIAL RATE - MUSE: 93 BPM
B-OH-BUTYR SERPL-SCNC: 0.4 MMOL/L
BILIRUB SERPL-MCNC: 2.8 MG/DL
BUN SERPL-MCNC: 5.6 MG/DL (ref 6–20)
CALCIUM SERPL-MCNC: 9.2 MG/DL (ref 8.6–10)
CHLORIDE SERPL-SCNC: 97 MMOL/L (ref 98–107)
CREAT SERPL-MCNC: 0.68 MG/DL (ref 0.51–0.95)
DEPRECATED HCO3 PLAS-SCNC: 24 MMOL/L (ref 22–29)
DIASTOLIC BLOOD PRESSURE - MUSE: NORMAL MMHG
EGFRCR SERPLBLD CKD-EPI 2021: >90 ML/MIN/1.73M2
ERYTHROCYTE [DISTWIDTH] IN BLOOD BY AUTOMATED COUNT: 14.3 % (ref 10–15)
FLUAV RNA SPEC QL NAA+PROBE: NEGATIVE
FLUBV RNA RESP QL NAA+PROBE: NEGATIVE
FOLATE SERPL-MCNC: >40 NG/ML (ref 4.6–34.8)
GLUCOSE SERPL-MCNC: 137 MG/DL (ref 70–99)
HCT VFR BLD AUTO: 38.9 % (ref 35–47)
HGB BLD-MCNC: 13.2 G/DL (ref 11.7–15.7)
INTERPRETATION ECG - MUSE: NORMAL
MAGNESIUM SERPL-MCNC: 2.2 MG/DL (ref 1.7–2.3)
MCH RBC QN AUTO: 39.9 PG (ref 26.5–33)
MCHC RBC AUTO-ENTMCNC: 33.9 G/DL (ref 31.5–36.5)
MCV RBC AUTO: 118 FL (ref 78–100)
P AXIS - MUSE: 47 DEGREES
PHOSPHATE SERPL-MCNC: 3.5 MG/DL (ref 2.5–4.5)
PLATELET # BLD AUTO: 297 10E3/UL (ref 150–450)
POTASSIUM SERPL-SCNC: 3.6 MMOL/L (ref 3.4–5.3)
PR INTERVAL - MUSE: 134 MS
PREALB SERPL-MCNC: 18 MG/DL (ref 20–40)
PROT SERPL-MCNC: 7.3 G/DL (ref 6.4–8.3)
QRS DURATION - MUSE: 80 MS
QT - MUSE: 368 MS
QTC - MUSE: 457 MS
R AXIS - MUSE: 16 DEGREES
RBC # BLD AUTO: 3.31 10E6/UL (ref 3.8–5.2)
RSV RNA SPEC NAA+PROBE: NEGATIVE
SARS-COV-2 RNA RESP QL NAA+PROBE: NEGATIVE
SODIUM SERPL-SCNC: 136 MMOL/L (ref 135–145)
SYSTOLIC BLOOD PRESSURE - MUSE: NORMAL MMHG
T AXIS - MUSE: 14 DEGREES
T4 FREE SERPL-MCNC: 1.55 NG/DL (ref 0.9–1.7)
TSH SERPL DL<=0.005 MIU/L-ACNC: 8.43 UIU/ML (ref 0.3–4.2)
VENTRICULAR RATE- MUSE: 93 BPM
VIT B12 SERPL-MCNC: 781 PG/ML (ref 232–1245)
WBC # BLD AUTO: 12.2 10E3/UL (ref 4–11)

## 2024-05-25 PROCEDURE — 84439 ASSAY OF FREE THYROXINE: CPT | Performed by: PHYSICIAN ASSISTANT

## 2024-05-25 PROCEDURE — 80053 COMPREHEN METABOLIC PANEL: CPT | Performed by: PHYSICIAN ASSISTANT

## 2024-05-25 PROCEDURE — 82607 VITAMIN B-12: CPT | Performed by: PHYSICIAN ASSISTANT

## 2024-05-25 PROCEDURE — 84443 ASSAY THYROID STIM HORMONE: CPT | Performed by: PHYSICIAN ASSISTANT

## 2024-05-25 PROCEDURE — 36415 COLL VENOUS BLD VENIPUNCTURE: CPT | Performed by: PHYSICIAN ASSISTANT

## 2024-05-25 PROCEDURE — 82010 KETONE BODYS QUAN: CPT | Performed by: PHYSICIAN ASSISTANT

## 2024-05-25 PROCEDURE — G0378 HOSPITAL OBSERVATION PER HR: HCPCS

## 2024-05-25 PROCEDURE — 83735 ASSAY OF MAGNESIUM: CPT | Performed by: PHYSICIAN ASSISTANT

## 2024-05-25 PROCEDURE — 99232 SBSQ HOSP IP/OBS MODERATE 35: CPT | Performed by: STUDENT IN AN ORGANIZED HEALTH CARE EDUCATION/TRAINING PROGRAM

## 2024-05-25 PROCEDURE — 84100 ASSAY OF PHOSPHORUS: CPT | Performed by: PHYSICIAN ASSISTANT

## 2024-05-25 PROCEDURE — 250N000013 HC RX MED GY IP 250 OP 250 PS 637: Performed by: STUDENT IN AN ORGANIZED HEALTH CARE EDUCATION/TRAINING PROGRAM

## 2024-05-25 PROCEDURE — 82746 ASSAY OF FOLIC ACID SERUM: CPT | Performed by: PHYSICIAN ASSISTANT

## 2024-05-25 PROCEDURE — 250N000013 HC RX MED GY IP 250 OP 250 PS 637: Performed by: PHYSICIAN ASSISTANT

## 2024-05-25 PROCEDURE — 87637 SARSCOV2&INF A&B&RSV AMP PRB: CPT | Performed by: PHYSICIAN ASSISTANT

## 2024-05-25 PROCEDURE — 85027 COMPLETE CBC AUTOMATED: CPT | Performed by: PHYSICIAN ASSISTANT

## 2024-05-25 RX ORDER — PANTOPRAZOLE SODIUM 40 MG/1
40 TABLET, DELAYED RELEASE ORAL
Status: DISCONTINUED | OUTPATIENT
Start: 2024-05-26 | End: 2024-05-26 | Stop reason: HOSPADM

## 2024-05-25 RX ORDER — HYDROXYZINE HYDROCHLORIDE 25 MG/1
25 TABLET, FILM COATED ORAL 3 TIMES DAILY PRN
Status: DISCONTINUED | OUTPATIENT
Start: 2024-05-25 | End: 2024-05-26 | Stop reason: HOSPADM

## 2024-05-25 RX ADMIN — FAMOTIDINE 20 MG: 20 TABLET ORAL at 00:42

## 2024-05-25 RX ADMIN — CLONIDINE HYDROCHLORIDE 0.1 MG: 0.1 TABLET ORAL at 09:12

## 2024-05-25 RX ADMIN — CLONIDINE HYDROCHLORIDE 0.1 MG: 0.1 TABLET ORAL at 00:42

## 2024-05-25 RX ADMIN — FAMOTIDINE 20 MG: 20 TABLET ORAL at 09:12

## 2024-05-25 RX ADMIN — FAMOTIDINE 20 MG: 20 TABLET ORAL at 20:52

## 2024-05-25 RX ADMIN — HYDROXYZINE HYDROCHLORIDE 25 MG: 25 TABLET, FILM COATED ORAL at 21:14

## 2024-05-25 RX ADMIN — CLONIDINE HYDROCHLORIDE 0.1 MG: 0.1 TABLET ORAL at 17:01

## 2024-05-25 RX ADMIN — FLUTICASONE FUROATE AND VILANTEROL TRIFENATATE 1 PUFF: 100; 25 POWDER RESPIRATORY (INHALATION) at 09:12

## 2024-05-25 RX ADMIN — THIAMINE HCL TAB 100 MG 100 MG: 100 TAB at 09:12

## 2024-05-25 RX ADMIN — FOLIC ACID 1 MG: 1 TABLET ORAL at 09:12

## 2024-05-25 RX ADMIN — Medication 1 TABLET: at 09:12

## 2024-05-25 RX ADMIN — LORAZEPAM 1 MG: 1 TABLET ORAL at 00:42

## 2024-05-25 RX ADMIN — HYDROXYZINE HYDROCHLORIDE 25 MG: 25 TABLET, FILM COATED ORAL at 13:55

## 2024-05-25 RX ADMIN — NICOTINE POLACRILEX 2 MG: 2 GUM, CHEWING BUCCAL at 06:16

## 2024-05-25 ASSESSMENT — ACTIVITIES OF DAILY LIVING (ADL)
ADLS_ACUITY_SCORE: 19
ADLS_ACUITY_SCORE: 18
ADLS_ACUITY_SCORE: 19

## 2024-05-25 NOTE — H&P
[facility]    History and Physical - Hospitalist Service, GOLD TEAM        Date of Admission:  5/24/2024    Assessment & Plan     Maxine Armas is a 34F w/ PMH alcohol use disorder, prior syncope and convulsions, asthma admitted on 5/24/2024 with abdominal pain X 1 month as well as seeking detox.    Alcohol use disorder and withdrawal  Amphetamine use  Cannabis use  Drinking more than half of a 1.75L bottle of hard alcohol over the course of 3 days. Last drink 11 PM on 5/23/2024. Utox in the ED + amphetamine, and cannabinoid.  BAC <0.01.  No  history of withdrawal seizures and DTs. MSSA currently 12 and 8. LFTs elevated as per below.  hCG negative.   - Continue on CIWA protocol with Ativan as indicated   - Seizure precautions   -Suspect patient could transfer to psychiatry if stable tomorrow   - MVI, folic acid, and thiamine supplements   - Notify medicine for SBP >180 or DBP >110    Abdominal pain and bloating, N/V  Transaminitis and Elevated bilirubin  Hepatic steatosis  Occurs in the setting of above EtOH use disorder and withdrawal.  Lipase, lactic, troponin normal.  hCG negative.  LFTs are elevated in a typical EtOH pattern with elevation in both direct and total bilirubin.  Abdominal ultrasound on admission with diffuse fatty infiltrate of the liver.  Normal sonographic appearance of the bile ducts and gallbladder and no right hydronephrosis.  -Supportive management for EtOH use disorder  -Advance diet as tolerated  -Simethicone and bowel regimen as needed  -Antiemetics as needed  -Limit hepatotoxins as able including 2 g acetaminophen limit per 24 hours    Trace hyponatremia and hypochloremia  Suspect may be a combination of GI as well as urinary light losses.  -Encourage oral hydration  -BMP in a.m.    Anion gap metabolic acidosis  Alcoholic ketosis  Suspect is secondary to acute EtOH toxicity.  Admission ketones 1.3, AG 17  -Treat underlying EtOH use  disorder and withdrawal  -Repeat CMP and ketone in a.m.    Leukocytosis with left shift  WBC 15.9 on admission.  UA is not consistent with clean-catch.  Given amphetamine use this could certainly produce a leukocytosis  -Repeat CBC in a.m.    Macrocytosis  Normal Hgb with MCV of 115.  Suspect is secondary to EtOH use  -Check folate and B12    Sinus tachycardia-noted on EKG-monitor    SENG-CPAP hs    syncopal episodes with convulsions - per ED ruled out for epilepsy and not associated with alcohol withdrawal, no neuro notes available - monitor    Tobacco use-smokes 1 pack/day-nicotine replacement and encourage cessation    Asthma-continue PTA albuterol HFA and Wixela        Diet: Regular Diet Adult    DVT Prophylaxis: Pneumatic Compression Devices  Guardado Catheter: Not present  Lines: None     Cardiac Monitoring: None  Code Status:  full    Clinically Significant Risk Factors Present on Admission                                  Disposition Plan     Medically Ready for Discharge: Anticipated in 2-4 Days         The patient's care was discussed with the Attending Physician, Dr. Ch, Bedside Nurse, and Patient.    Meenu Rodgers PA-C  Hospitalist Service, [facility]  Securely message with [employer] (more info)  Text page via [facility] Paging/Directory   See signed in provider for up to date coverage information    ______________________________________________________________________    Chief Complaint   Seeking detox, abdominal pain    History is obtained from the patient and the patient's chart    History of Present Illness   Maxine Armas is a 34F w/ PMH alcohol use disorder, prior syncope and convulsions, asthma admitted on 5/24/2024 with abdominal pain X 1 month as well as seeking detox.    Patient had initially presented to urgent care and was advised to wean herself off of alcohol.  She was unable to come up with a regimen to wean herself off alcohol at  home and was advised per provider she is presenting for detox from alcohol.    Patient notes a longstanding history of of alcohol use disorder for which she remains very functional and has been able to maintain work, driving, relationships.  She notes that she and her  generally go through a 1.75 L bottle of hard alcohol in about 3 days and she feels that she is drinking the majority of this amount.  She has had episodes of what her friends have described to her as seizure events with convulsion and loss of consciousness without tongue biting or urination.  These were attributed per her recollection to be syncope with convulsions.  She notes having a EEG that was negative for seizure activity.     Patient is most concerned and alarmed about not being able to eat.  She notes first noticing trouble eating August 2023 which lasted for few days and since then had had a few intermittent episodes of having difficulty eating due to lack of desire and early satiety.  Since January of this year this is being a more frequent issue and has been severe over the last 1 month.  She reports going on a cruise vacation to several countries in [place] with family and despite having many palatable food options has generally only been able to eat about a cup of food per day by forcing herself and typically this is in the evenings.  She has daily nausea and vomiting that is generally nonbloody and does not have a coffee-ground appearance.  She did have a one-time episode of the quarter sized episode of blood that occurred with an episode of both coughing and vomiting 4 months ago and she is uncertain whether this was an emesis versus coughed up.    More recently patient has been noticing new onset of numbness and tingling in the periorbital region, bilateral fingers and abdomen.  She notes a sensation of feeling as though her abdomen does not belong to her then she is touching someone else's stomach.  She also endorses  having bloating.  She was prescribed omeprazole in January which she was reluctant to take due to side effects and tolerated taking a few doses more recently but did not notice that this was helping her symptoms.    She notes that watery diarrhea is a regular ongoing occurrence that she is attributed to alcohol use.  She has not noticed any urinary complaints including dysuria.  She has had some occasional night sweats.  She notes her weight has been stable.  Patient does not note any fevers or chills.    Patient uses CPAP at night but notes that often she will remove it in her sleep unwittingly as it is uncomfortable to wear.      Past Medical History    Past Medical History:   Diagnosis Date    SENG (obstructive sleep apnea)     Substance abuse (H)    Alcohol use disorder  Asthma  Tobacco use    Past Surgical History   [place] teeth extraction, no other surgeries    Prior to Admission Medications   Prior to Admission Medications   Prescriptions Last Dose Informant Patient Reported? Taking?   WIXELA INHUB 250-50 MCG/ACT inhaler 5/23/2024  Yes Yes   Sig: Inhale 1 puff into the lungs 2 times daily   albuterol (PROAIR HFA/PROVENTIL HFA/VENTOLIN HFA) 108 (90 Base) MCG/ACT inhaler 5/24/2024  Yes Yes   Sig: Inhale 2 puffs into the lungs 4 times daily      Facility-Administered Medications: None        Review of Systems    The 10 point Review of Systems is negative other than noted in the HPI or here.     Social History   I have reviewed this patient's social history and updated it with pertinent information if needed.  Social History     Tobacco Use    Smoking status: Every Day     Types: Cigarettes    Smokeless tobacco: Never   Substance Use Topics    Alcohol use: Yes     Comment: a pint daily    Drug use: Yes     Types: Marijuana         Allergies   No Known Allergies     Physical Exam   Vital Signs: Temp: 98.5  F (36.9  C) Temp src: Oral BP: (!) 132/95 Pulse: 93   Resp: 16 SpO2: 99 % O2 Device: None (Room air)     Weight: 150 lbs 0 oz  GENERAL: Alert and oriented x 3. NAD.  Sitting upright and repositions independently. Cooperative.   HEENT: Anicteric sclera. Mucous membranes moist.  Throat clear.  Neck supple.  Thyroid nontender without nodules or thyroidomegaly.  NC. AT.  Pupils equal and round.  EOMI.  CV: RRR. S1, S2. No murmurs appreciated.   RESPIRATORY: Effort normal on RA. Lungs CTAB with no wheezing, rales, rhonchi.   GI: + Mild bilateral lower quadrant tenderness to palpation.  Abdomen soft, NABS.  No other tenderness.  No  rebound, guarding. No lesions.   NEUROLOGICAL: No focal deficits. Moves all extremities.  No asterixis.  EXTREMITIES: No peripheral edema. Intact bilateral pedal pulses.   SKIN: No jaundice. No rashes on exposed skin.  BACK: no CVA tenderness, no spinous tenderness     Medical Decision Making       70 MINUTES SPENT BY ME on the date of service doing chart review, history, exam, documentation & further activities per the note.      Data     I have personally reviewed the following data over the past 24 hrs:    15.9 (H)  \   15.2   / 351     134 (L) 93 (L) 7.7 /  110 (H)   4.3 24 0.61 \     ALT: 100 (H) AST: 277 (H) AP: 196 (H) TBILI: 2.8 (H)   ALB: 4.4 TOT PROTEIN: 8.1 LIPASE: 35     Trop: <6 BNP: N/A     Procal: 0.32 CRP: N/A Lactic Acid: 1.6       INR:  1.02 PTT:  34   D-dimer:  0.36 Fibrinogen:  N/A

## 2024-05-25 NOTE — UTILIZATION REVIEW
"  Admission Status; Secondary Review Determination         Under the authority of the Utilization Management Committee, the utilization review process indicated a secondary review on the above patient.  The review outcome is based on review of the medical records, discussions with staff, and applying clinical experience noted on the date of the review.          (x) Observation Status Appropriate - This patient does not meet hospital inpatient criteria and is placed in observation status. If this patient's primary payer is Medicare and was admitted as an inpatient, Condition Code 44 should be used and patient status changed to \"observation\".     RATIONALE FOR DETERMINATION   34-year-old female with a history of alcohol use disorder, prior syncope and convulsions, and asthma was admitted on 5/24/2024 with abdominal pain and seeking detox. She consumes more than half of a 1.75L bottle of hard alcohol over three days, with her last drink on 5/23/2024. Utox was positive for amphetamine and cannabinoids, BAC <0.01. LFTs are elevated, indicating hepatic steatosis, with an ultrasound showing diffuse fatty liver infiltrate. The patient is on the CIWA protocol with Ativan for withdrawal management and has seizure precautions in place. Abdominal pain is managed supportively, with dietary advancements and antiemetics as needed. Labs show trace hyponatremia and hypochloremia, attributed to GI and urinary losses, with oral hydration encouraged and BMP to be repeated in the morning.  Patient CIWA scores are low no evidence of severe alcohol withdrawal. Her abdominal pain, associated with hepatic steatosis and elevated LFTs, is being managed supportively, and her ultrasound shows no acute complications requiring prolonged care.There are no indications of severe withdrawal complications like seizures or delirium tremens, nor any acute surgical or complex medical conditions necessitating extended inpatient care.    The severity of " illness, intensity of service provided, expected LOS and risk for adverse outcome make the care appropriate for further observation; however, doesn't meet criteria for hospital inpatient admission. Dr Holden notified of this determination.    This document was produced using voice recognition software.      The information on this document is developed by the utilization review team in order for the business office to ensure compliance.  This only denotes the appropriateness of proper admission status and does not reflect the quality of care rendered.         The definitions of Inpatient Status and Observation Status used in making the determination above are those provided in the CMS Coverage Manual, Chapter 1 and Chapter 6, section 70.4.      Sincerely,     JOSE ROBIN MD    System Medical Director  Utilization Management  [facility].

## 2024-05-25 NOTE — MEDICATION SCRIBE - ADMISSION MEDICATION HISTORY
Medication Scribe Admission Medication History    Admission medication history is complete. The information provided in this note is only as accurate as the sources available at the time of the update.    Information Source(s): Patient and CareEverywhere/SureScripts via in-person    Pertinent Information: N/A    Changes made to PTA medication list:  Added: wixela  Deleted: spironolactone, flovent  Changed: None    Allergies reviewed with patient and updates made in EHR: yes    Medication History Completed By: Anuradha Jones 5/24/2024 7:47 PM    PTA Med List   Medication Sig Last Dose    albuterol (PROAIR HFA/PROVENTIL HFA/VENTOLIN HFA) 108 (90 Base) MCG/ACT inhaler Inhale 2 puffs into the lungs 4 times daily 5/24/2024    WIXELA INHUB 250-50 MCG/ACT inhaler Inhale 1 puff into the lungs 2 times daily 5/23/2024

## 2024-05-25 NOTE — PLAN OF CARE
"Goal Outcome Evaluation:      Plan of Care Reviewed With: patient    Overall Patient Progress: improving     Outcome Evaluation: Pt has been without withdrawl symptoms today and her CIWA numbers are 1-2 at highest. Pt is feeling good; except for the abdominal bloating and fullness feeling.    Pt requested that her diet plan be advanced so that she can order food.     VS: /59 (BP Location: Right arm)   Pulse 80   Temp 98  F (36.7  C) (Oral)   Resp 15   Ht 1.676 m (5' 6\")   Wt 68 kg (149 lb 14.6 oz)   LMP 05/17/2024   SpO2 97%   BMI 24.20 kg/m     O2: Room Air   Output: Continent x 2   Last BM: 5/25/24   Activity: Independent    Up for meals? Pt sits up in bed   Skin: Intact w/ some bruising on the upper arms   Pain: Pt reports abdominal pain that is tolerable - fullness/bloating   CMS: A&O x 4; Calm, cooperative, and pleasant   Dressing: Tegaderm over IV with Coban over the top   Diet: Regular Diet; Thin Liquids   LDA: PIV anterior right forearm    Plan: Monitoring for Alcohol withdrawal and possible DETOX placement   Additional Info: Pt knows that she has to let a staff member know when she is stepping outside for a cigarette and that she has 2 hours to return to the floor. Pt has been great about this.          "

## 2024-05-25 NOTE — PROGRESS NOTES
[facility]    Medicine Progress Note - Hospitalist Service, GOLD TEAM 21    Date of Admission:  5/24/2024    Assessment & Plan   Maxine Armas is a 34 F w/ past medical history of alcohol use disorder, prior syncope and convulsions, asthma admitted on 5/24/2024 with abdominal pain and seeking detox for alcohol use.    Today's changes:  -Start hydroxyzine for anxiety  -Social work consult for treatment resources  -Continue CIWA with ativan  -Likely discharge to home tomorrow if doing well, no longer withdrawing    Alcohol use disorder and withdrawal  Amphetamine use  Cannabis use  Drinking more than half of a 1.75L bottle of hard alcohol over the course of 3 days. Last drink 11 PM on 5/23/2024. Utox in the ED + amphetamine, and cannabinoid.  BAC <0.01.  No  history of withdrawal seizures and DTs. MSSA currently 12 and 8. LFTs elevated as per below.  hCG negative. Maddrey's score low, no indication for steroid use.  -Continue on CIWA protocol with Ativan as indicated  -Seizure precautions  -MVI, folic acid, and thiamine supplements     Abdominal pain and bloating, N/V  Transaminitis and Elevated bilirubin  Hepatic steatosis  Occurs in the setting of above EtOH use disorder and withdrawal.  Lipase, lactic, troponin normal.  hCG negative.  LFTs are elevated in a typical EtOH pattern with elevation in both direct and total bilirubin.  Abdominal ultrasound on admission with diffuse fatty infiltrate of the liver.  Normal sonographic appearance of the bile ducts and gallbladder and no right hydronephrosis.  -Supportive management for EtOH use disorder  -Advance diet as tolerated  -Simethicone and bowel regimen as needed  -Antiemetics as needed  -Limit hepatotoxins as able including 2 g acetaminophen limit per 24 hours  -Check hepatitis panel     Trace hyponatremia and hypochloremia  Suspect may be a combination of GI as well as urinary light losses.  -Encourage oral  hydration     Anion gap metabolic acidosis  Alcoholic ketosis  Suspect is secondary to acute EtOH toxicity.  Admission ketones 1.3, AG 17  -Treat underlying EtOH use disorder and withdrawal     Leukocytosis with left shift  WBC 15.9 on admission.  UA is not consistent with clean-catch.  Given amphetamine use this could certainly produce a leukocytosis     Macrocytosis  Normal Hgb with MCV of 115.  Suspect is secondary to EtOH use  -Check folate and B12     Sinus tachycardia-noted on EKG-monitor     SENG-CPAP hs     Syncopal episodes with convulsions - per ED ruled out for epilepsy and not associated with alcohol withdrawal, no neuro notes available - monitor     Tobacco use-smokes 1 pack/day-nicotine replacement and encourage cessation     Asthma-continue PTA albuterol HFA and Wixela    Depression and anxiety  Serve as triggers for alcohol use. Patient would benefit from antidepressant therapy as outpatient, consider use of bupropion - ideally would start when further away from withdrawal symptoms.  -Start hydroxyzine for anxiety        Diet: Advance Diet as Tolerated: Clear Liquid Diet    DVT Prophylaxis: Pneumatic Compression Devices  Guardado Catheter: Not present  Lines: None     Cardiac Monitoring: None  Code Status: Full Code      Clinically Significant Risk Factors Present on Admission                                  Disposition Plan     Medically Ready for Discharge: Anticipated Tomorrow       Joe Holden MD  Hospitalist Service, GOLD TEAM 21  [facility]  Securely message with [employer] (more info)  Text page via [employer] Paging/Directory   See signed in provider for up to date coverage information  ______________________________________________________________________    Interval History   No acute events overnight. Alcohol withdrawal symptoms have been minimal, required ativan x1 this morning.    Patient is interested in counseling/therapy for alcohol use disorder as  outpatient - will give resources prior to discharge. She notes ongoing anxiety and depression symptoms are a large trigger for alcohol use at home.    Physical Exam   Vital Signs: Temp: 98.4  F (36.9  C) Temp src: Oral BP: 92/75 Pulse: 84   Resp: 17 SpO2: 97 % O2 Device: None (Room air)    Weight: 149 lbs 14.6 oz    General: appears comfortable, in no acute distress  HEENT: anicteric sclera, moist mucous membranes  Cardiovascular: regular rate and rhythm, no murmurs, 2+ distal pulses equal bilaterally  Respiratory: no increased work of breathing, lungs clear to auscultation throughout, no wheezing or crackles  Abdomen: soft, non-distended, non-tender throughout  Extremities: No appreciable edema  Skin: no concerning rashes or lesions  Neuro: A&O x4, responds appropriately to exam    Medical Decision Making       45 MINUTES SPENT BY ME on the date of service doing chart review, history, exam, documentation & further activities per the note.      Data     I have personally reviewed the following data over the past 24 hrs:    12.2 (H)  \   13.2   / 297     136 97 (L) 5.6 (L) /  137 (H)   3.6 24 0.68 \     ALT: 83 (H) AST: 234 (H) AP: 171 (H) TBILI: 2.8 (H)   ALB: 4.0 TOT PROTEIN: 7.3 LIPASE: 35     Trop: <6 BNP: N/A     TSH: 8.43 (H) T4: 1.55 A1C: N/A     Procal: 0.32 CRP: N/A Lactic Acid: 1.6       INR:  1.02 PTT:  34   D-dimer:  0.36 Fibrinogen:  N/A       Imaging results reviewed over the past 24 hrs:   Recent Results (from the past 24 hour(s))   US Abdomen Limited    Narrative    EXAM: US ABDOMEN LIMITED  LOCATION: [facility]  DATE: 5/24/2024    INDICATION: Hx ETOH use disorder, abdominal pain, transaminitis and hyperbilirubinemia  COMPARISON: None.  TECHNIQUE: Limited abdominal ultrasound.    FINDINGS:    GALLBLADDER: Normal. No gallstones, wall thickening, or pericholecystic fluid. Negative sonographic Grullon's sign.    BILE DUCTS: No biliary dilatation. The common  duct measures 3 mm.    LIVER: Increased echogenicity from diffuse fatty infiltration. Hepatomegaly, no focal mass evident.    RIGHT KIDNEY: No hydronephrosis.    PANCREAS: The visualized portions are normal.    No ascites.      Impression    IMPRESSION:  1.  Hepatomegaly with diffuse fatty infiltration of liver.  2.  Gallbladder and bile ducts normal.       CT Abdomen Pelvis w Contrast    Narrative    EXAM: CT ABDOMEN PELVIS W CONTRAST  LOCATION: [facility]  DATE: 5/24/2024    INDICATION: Abd pain, bloating, hyperbilirubinemia and transaminitis, hx EtOH use disorder  COMPARISON: None.  TECHNIQUE: CT scan of the abdomen and pelvis was performed following injection of IV contrast. Multiplanar reformats were obtained. Dose reduction techniques were used.  CONTRAST: 74mL Isovue 370    FINDINGS:   LOWER CHEST: Calcified granuloma left lower lobe.    HEPATOBILIARY: Hepatomegaly with significant fatty infiltration of the liver.    PANCREAS: Normal.    SPLEEN: Splenomegaly.    ADRENAL GLANDS: Normal.    KIDNEYS/BLADDER: Normal.    BOWEL: Normal.    LYMPH NODES: Normal.    VASCULATURE: Normal.    PELVIC ORGANS: Normal.    MUSCULOSKELETAL: Normal.      Impression    IMPRESSION:   1.  Hepatomegaly with significant fatty infiltration of liver. Splenomegaly    2.  Normal appendix.

## 2024-05-25 NOTE — ED NOTES
[facility]   ED Nurse to Floor Handoff     Maxine Armas is a 34 year old female who speaks English and lives with a spouse,  in a home  They arrived in the ED by car from home.  Maxine Armas is a 34 year old female with a past medical history of alcohol use disorder, syncopal episodes with convulsions (ruled out for epilepsy and not associated with alcohol withdrawal), asthma who presents to the Emergency Department with a 1 month history of abdominal pain, abnormal labs seeking assistance with alcohol detox.  She has been drinking a pint of Captain Jimmy daily, last drink at 11 PM last night.  She gets shakes and mild alcohol withdrawal but has no history of seizures or delirium tremens.  Will occasionally use cannabis but no other recreational substances.  Drinks at least 0.5 L of hard liquor daily with 2-3 white claws.     Regarding her abdominal pain she describes fullness and discomfort in the upper abdomen and abdominal bloating.  She has had nausea and vomiting.  She has a significantly decreased appetite.  No hematemesis no hematuria no abnormal bleeding or bruising reported.  No bladder or bowel concerns.     She had been seen by primary care who was alarmed by her abnormal labs and attempted to arrange outpatient CT which was unsuccessful due to insurance preauthorization issues.  She then went to an urgent care who thought that her abdominal issues could be worked up on an outpatient basis however if she did intend to start drinking they were very worried about significant alcohol withdrawal and recommended she present for evaluation for possible detox.     Labs from outside primary care clinic show mild hyperbilirubinemia and transaminitis, moderate alk phos elevation.  She also has a slight anemia and leukocytosis.    ED Chief Complaint: Alcohol Problem (Here for detox from alcohol, drinks a pint of captirma giron daily, last drink was at 11 pm  last night)    ED Dx;   Final diagnoses:   Severe alcohol use disorder (H)   Alcohol withdrawal syndrome without complication (H)   Hepatic steatosis   Alcoholic ketoacidosis   Starvation ketoacidosis   Leukocytosis, unspecified type   Transaminitis   Hyperbilirubinemia   Alkaline phosphatase elevation         Needed?: No    Allergies: No Known Allergies.  Past Medical Hx:   Past Medical History:   Diagnosis Date    SENG (obstructive sleep apnea)     Substance abuse (H)       Baseline Mental status: WDL  Current Mental Status changes: at basesline    Infection present or suspected this encounter: no  Sepsis suspected: No  Isolation type: No active isolations  Patient tested for COVID 19 prior to admission: NO     Activity level - Baseline/Home:  Independent  Activity Level - Current:   Independent    Bariatric equipment needed?: No    In the ED these meds were given:   Medications   diazepam (VALIUM) tablet 5-20 mg (10 mg Oral $Given 5/24/24 2019)   multivitamin w/minerals (THERA-VIT-M) tablet 1 tablet (has no administration in time range)   diazepam (VALIUM) tablet 10 mg (10 mg Oral $Given 5/24/24 1757)   dextrose 5% and 0.45% NaCl 1,000 mL with Infuvite Adult 10 mL, thiamine 100 mg, folic acid 1 mg infusion ( Intravenous Stopped 5/24/24 1950)   thiamine (B-1) injection 100 mg (100 mg Intravenous $Given 5/24/24 1758)   folic acid (FOLVITE) tablet 1 mg (1 mg Oral $Given 5/24/24 1758)   pantoprazole (PROTONIX) EC tablet 40 mg (40 mg Oral $Given 5/24/24 1855)   iopamidol (ISOVUE-370) solution 100 mL (74 mLs Intravenous $Given 5/24/24 1911)   sodium chloride 0.9 % bag 100mL (36 mLs Intravenous $Given 5/24/24 1911)       Drips running?  No    Home pump  No    Current LDAs  Peripheral IV 05/24/24 Right Antecubital fossa (Active)   Site Assessment WDL 05/24/24 1739   Line Status Saline locked;blood return noted 05/24/24 1739   Dressing Transparent 05/24/24 1739   Dressing Status clean;dry;intact 05/24/24 1739    Dressing Intervention New dressing  05/24/24 1739   Line Intervention Lab drawn;Flushed 05/24/24 1739   Phlebitis Scale 0-->no symptoms 05/24/24 1739   Infiltration? no 05/24/24 1739   Number of days: 0       Labs results:   Labs Ordered and Resulted from Time of ED Arrival to Time of ED Departure   BASIC METABOLIC PANEL - Abnormal       Result Value    Sodium 134 (*)     Potassium 4.3      Chloride 93 (*)     Carbon Dioxide (CO2) 24      Anion Gap 17 (*)     Urea Nitrogen 7.7      Creatinine 0.61      GFR Estimate >90      Calcium 10.0      Glucose 110 (*)    HEPATIC FUNCTION PANEL - Abnormal    Protein Total 8.1      Albumin 4.4      Bilirubin Total 2.8 (*)     Alkaline Phosphatase 196 (*)      (*)      (*)     Bilirubin Direct 1.54 (*)    ROUTINE UA WITH MICROSCOPIC REFLEX TO CULTURE - Abnormal    Color Urine Orange (*)     Appearance Urine Slightly Cloudy (*)     Glucose Urine Negative      Bilirubin Urine Small (*)     Ketones Urine 10 (*)     Specific Gravity Urine 1.031      Blood Urine Negative      pH Urine 6.5      Protein Albumin Urine 100 (*)     Urobilinogen Urine 2.0      Nitrite Urine Negative      Leukocyte Esterase Urine Negative      Mucus Urine Present (*)     RBC Urine 3 (*)     WBC Urine 6 (*)     Squamous Epithelials Urine 7 (*)    CBC WITH PLATELETS AND DIFFERENTIAL - Abnormal    WBC Count 15.9 (*)     RBC Count 3.79 (*)     Hemoglobin 15.2      Hematocrit 43.5       (*)     MCH 40.1 (*)     MCHC 34.9      RDW 14.1      Platelet Count 351      % Neutrophils 82      % Lymphocytes 10      % Monocytes 5      % Eosinophils 1      % Basophils 1      % Immature Granulocytes 1      NRBCs per 100 WBC 0      Absolute Neutrophils 13.2 (*)     Absolute Lymphocytes 1.7      Absolute Monocytes 0.8      Absolute Eosinophils 0.1      Absolute Basophils 0.1      Absolute Immature Granulocytes 0.1      Absolute NRBCs 0.0     BLOOD GAS VENOUS - Abnormal    pH Venous 7.43      pCO2 Venous  42      pO2 Venous 25      Bicarbonate Venous 28      Base Excess/Deficit Venous 3.2 (*)     FIO2 96      Oxyhemoglobin Venous 41 (*)     O2 Sat, Venous 43.2 (*)    KETONE BETA-HYDROXYBUTYRATE QUANTITATIVE, RAPID - Abnormal    Ketone (Beta-Hydroxybutyrate) Quantitative 1.30 (*)    URINE DRUG SCREEN PANEL - Abnormal    Amphetamines Urine Screen Positive (*)     Barbituates Urine Screen Negative      Benzodiazepine Urine Screen Negative      Cannabinoids Urine Screen Positive (*)     Cocaine Urine Screen Negative      Fentanyl Qual Urine Screen Negative      Opiates Urine Screen Negative      PCP Urine Screen Negative     HCG QUALITATIVE URINE - Normal    hCG Urine Qualitative Negative     LIPASE - Normal    Lipase 35     LACTIC ACID WHOLE BLOOD WITH 1X REPEAT IN 2 HR WHEN >2 - Normal    Lactic Acid, Initial 1.6     D DIMER QUANTITATIVE - Normal    D-Dimer Quantitative 0.36     INR - Normal    INR 1.02     PARTIAL THROMBOPLASTIN TIME - Normal    aPTT 34     MAGNESIUM - Normal    Magnesium 2.0     TROPONIN T, HIGH SENSITIVITY - Normal    Troponin T, High Sensitivity <6     PHOSPHORUS - Normal    Phosphorus 2.7     ETHYL ALCOHOL LEVEL - Normal    Alcohol ethyl <0.01     PROCALCITONIN - Normal    Procalcitonin 0.32     HCG QUALITATIVE URINE POCT - Normal    HCG Qual Urine Negative      Internal QC Check POCT Valid      POCT Kit Lot Number [id number]      POCT Kit Expiration Date 10/30/2025     PREALBUMIN   BLOOD CULTURE   BLOOD CULTURE       Imaging Studies:   Recent Results (from the past 24 hour(s))   US Abdomen Limited    Narrative    EXAM: US ABDOMEN LIMITED  LOCATION: [facility]  DATE: 5/24/2024    INDICATION: Hx ETOH use disorder, abdominal pain, transaminitis and hyperbilirubinemia  COMPARISON: None.  TECHNIQUE: Limited abdominal ultrasound.    FINDINGS:    GALLBLADDER: Normal. No gallstones, wall thickening, or pericholecystic fluid. Negative sonographic Grullon's  "sign.    BILE DUCTS: No biliary dilatation. The common duct measures 3 mm.    LIVER: Increased echogenicity from diffuse fatty infiltration. Hepatomegaly, no focal mass evident.    RIGHT KIDNEY: No hydronephrosis.    PANCREAS: The visualized portions are normal.    No ascites.      Impression    IMPRESSION:  1.  Hepatomegaly with diffuse fatty infiltration of liver.  2.  Gallbladder and bile ducts normal.       CT Abdomen Pelvis w Contrast    Narrative    EXAM: CT ABDOMEN PELVIS W CONTRAST  LOCATION: [facility]  DATE: 5/24/2024    INDICATION: Abd pain, bloating, hyperbilirubinemia and transaminitis, hx EtOH use disorder  COMPARISON: None.  TECHNIQUE: CT scan of the abdomen and pelvis was performed following injection of IV contrast. Multiplanar reformats were obtained. Dose reduction techniques were used.  CONTRAST: 74mL Isovue 370    FINDINGS:   LOWER CHEST: Calcified granuloma left lower lobe.    HEPATOBILIARY: Hepatomegaly with significant fatty infiltration of the liver.    PANCREAS: Normal.    SPLEEN: Splenomegaly.    ADRENAL GLANDS: Normal.    KIDNEYS/BLADDER: Normal.    BOWEL: Normal.    LYMPH NODES: Normal.    VASCULATURE: Normal.    PELVIC ORGANS: Normal.    MUSCULOSKELETAL: Normal.      Impression    IMPRESSION:   1.  Hepatomegaly with significant fatty infiltration of liver. Splenomegaly    2.  Normal appendix.       Recent vital signs:   BP (!) 132/95   Pulse 93   Temp 98.5  F (36.9  C) (Oral)   Resp 16   Ht 1.676 m (5' 6\")   Wt 68 kg (150 lb)   LMP 05/17/2024   SpO2 99%   BMI 24.21 kg/m      Orwell Coma Scale Score: 15 (05/24/24 1709)       Cardiac Rhythm: Other  Pt needs tele? No  Skin/wound Issues: None    Code Status: Full Code    Pain control: fair    Nausea control: fair    Abnormal labs/tests/findings requiring intervention: see epic    Family present during ED course? Yes   Family Comments/Social Situation comments: NA    Tasks needing " completion: None    Venus Childs, RN    [phone] [facility]

## 2024-05-25 NOTE — PLAN OF CARE
Pt arrived to unit from  ED via WC around 0000    AOx4. Unable to obtain CPAP machine this shift, RT will attempt to have one tonight for pt if available. C/o mild abd pain, pt declined offered interventions. Up IND, steady. Tolerating clear liquid diet, reports low appetite. Skin intact ex scattered bruising, pt refused 2 RN skin check. PIV SL. CIWA scores: 8, 2, 2. Given Ativan x1 per order parameters. Pending CD consult.   -Respiratory panel collected, resulted negative.    For vital signs and complete assessments, please see documentation flowsheets.         Patient has been educated on potential risks of choosing to leave the unit and that the responsibility for patient well-being will belong to the patient. Pt has been informed that admission to hospital is due to need for medical treatment. Education given to the patient on some of the potential risks included but are not limited to:      - lack of access to nursing intervention      - possible missed appointments with MD, therapies, tests      - possible missed medications, antibiotics, management of IV's    Patient Response:acceptance    Patient notified staff prior to leaving unit: yes  Coban wrap placed over IV prior to pt leaving unit yes

## 2024-05-25 NOTE — PLAN OF CARE
"Patient has been educated on potential risks of choosing to leave the unit and that the responsibility for patient well-being will belong to the patient. Pt has been informed that admission to hospital is due to need for medical treatment. Education given to the patient on some of the potential risks included but are not limited to:      - lack of access to nursing intervention      - possible missed appointments with MD, therapies, tests      - possible missed medications, antibiotics, management of IV's    Patient Response: \"Hi I'm going to step outside for a minute and my nurse told me to let the  know. I'm in 535\"    Patient notified staff prior to leaving unit: Yes  Coban wrap placed over IV prior to pt leaving unit: No   "

## 2024-05-26 VITALS
RESPIRATION RATE: 16 BRPM | DIASTOLIC BLOOD PRESSURE: 62 MMHG | HEART RATE: 71 BPM | BODY MASS INDEX: 24.09 KG/M2 | TEMPERATURE: 98.4 F | HEIGHT: 66 IN | OXYGEN SATURATION: 98 % | WEIGHT: 149.91 LBS | SYSTOLIC BLOOD PRESSURE: 90 MMHG

## 2024-05-26 PROBLEM — T73.0XXA STARVATION KETOACIDOSIS: Status: RESOLVED | Noted: 2024-05-24 | Resolved: 2024-05-26

## 2024-05-26 PROBLEM — E87.29 ALCOHOLIC KETOACIDOSIS: Status: RESOLVED | Noted: 2024-05-24 | Resolved: 2024-05-26

## 2024-05-26 PROBLEM — E87.29 STARVATION KETOACIDOSIS: Status: RESOLVED | Noted: 2024-05-24 | Resolved: 2024-05-26

## 2024-05-26 PROBLEM — E80.6 HYPERBILIRUBINEMIA: Status: RESOLVED | Noted: 2024-05-24 | Resolved: 2024-05-26

## 2024-05-26 PROBLEM — F10.930 ALCOHOL WITHDRAWAL SYNDROME WITHOUT COMPLICATION (H): Status: RESOLVED | Noted: 2024-05-24 | Resolved: 2024-05-26

## 2024-05-26 PROBLEM — R74.01 TRANSAMINITIS: Status: RESOLVED | Noted: 2024-05-24 | Resolved: 2024-05-26

## 2024-05-26 LAB
ALBUMIN SERPL BCG-MCNC: 3.5 G/DL (ref 3.5–5.2)
ALP SERPL-CCNC: 154 U/L (ref 40–150)
ALT SERPL W P-5'-P-CCNC: 83 U/L (ref 0–50)
ANION GAP SERPL CALCULATED.3IONS-SCNC: 12 MMOL/L (ref 7–15)
AST SERPL W P-5'-P-CCNC: 283 U/L (ref 0–45)
BILIRUB SERPL-MCNC: 2.1 MG/DL
BUN SERPL-MCNC: 5 MG/DL (ref 6–20)
CALCIUM SERPL-MCNC: 9.1 MG/DL (ref 8.6–10)
CHLORIDE SERPL-SCNC: 98 MMOL/L (ref 98–107)
CREAT SERPL-MCNC: 0.65 MG/DL (ref 0.51–0.95)
DEPRECATED HCO3 PLAS-SCNC: 24 MMOL/L (ref 22–29)
EGFRCR SERPLBLD CKD-EPI 2021: >90 ML/MIN/1.73M2
GLUCOSE SERPL-MCNC: 98 MG/DL (ref 70–99)
HAV IGM SERPL QL IA: NONREACTIVE
HBV CORE IGM SERPL QL IA: NONREACTIVE
HBV SURFACE AG SERPL QL IA: NONREACTIVE
HCV AB SERPL QL IA: NONREACTIVE
POTASSIUM SERPL-SCNC: 4.6 MMOL/L (ref 3.4–5.3)
PROT SERPL-MCNC: 6.7 G/DL (ref 6.4–8.3)
SODIUM SERPL-SCNC: 134 MMOL/L (ref 135–145)

## 2024-05-26 PROCEDURE — G0378 HOSPITAL OBSERVATION PER HR: HCPCS

## 2024-05-26 PROCEDURE — 250N000013 HC RX MED GY IP 250 OP 250 PS 637: Performed by: STUDENT IN AN ORGANIZED HEALTH CARE EDUCATION/TRAINING PROGRAM

## 2024-05-26 PROCEDURE — 250N000013 HC RX MED GY IP 250 OP 250 PS 637: Performed by: PHYSICIAN ASSISTANT

## 2024-05-26 PROCEDURE — 99239 HOSP IP/OBS DSCHRG MGMT >30: CPT | Performed by: STUDENT IN AN ORGANIZED HEALTH CARE EDUCATION/TRAINING PROGRAM

## 2024-05-26 PROCEDURE — 80053 COMPREHEN METABOLIC PANEL: CPT | Performed by: STUDENT IN AN ORGANIZED HEALTH CARE EDUCATION/TRAINING PROGRAM

## 2024-05-26 PROCEDURE — 36415 COLL VENOUS BLD VENIPUNCTURE: CPT | Performed by: STUDENT IN AN ORGANIZED HEALTH CARE EDUCATION/TRAINING PROGRAM

## 2024-05-26 PROCEDURE — 80074 ACUTE HEPATITIS PANEL: CPT | Performed by: STUDENT IN AN ORGANIZED HEALTH CARE EDUCATION/TRAINING PROGRAM

## 2024-05-26 RX ORDER — HYDROXYZINE HYDROCHLORIDE 25 MG/1
25 TABLET, FILM COATED ORAL 3 TIMES DAILY PRN
Qty: 60 TABLET | Refills: 3 | Status: SHIPPED | OUTPATIENT
Start: 2024-05-26

## 2024-05-26 RX ORDER — LANOLIN ALCOHOL/MO/W.PET/CERES
100 CREAM (GRAM) TOPICAL DAILY
Qty: 90 TABLET | Refills: 3 | Status: SHIPPED | OUTPATIENT
Start: 2024-05-27

## 2024-05-26 RX ORDER — FOLIC ACID 1 MG/1
1 TABLET ORAL DAILY
Qty: 90 TABLET | Refills: 3 | Status: SHIPPED | OUTPATIENT
Start: 2024-05-27

## 2024-05-26 RX ORDER — MULTIPLE VITAMINS W/ MINERALS TAB 9MG-400MCG
1 TAB ORAL DAILY
Qty: 90 TABLET | Refills: 3 | Status: SHIPPED | OUTPATIENT
Start: 2024-05-27

## 2024-05-26 RX ADMIN — PANTOPRAZOLE SODIUM 40 MG: 40 TABLET, DELAYED RELEASE ORAL at 06:41

## 2024-05-26 RX ADMIN — HYDROXYZINE HYDROCHLORIDE 25 MG: 25 TABLET, FILM COATED ORAL at 08:47

## 2024-05-26 RX ADMIN — THIAMINE HCL TAB 100 MG 100 MG: 100 TAB at 08:46

## 2024-05-26 RX ADMIN — Medication 1 TABLET: at 08:46

## 2024-05-26 RX ADMIN — ALBUTEROL SULFATE 2 PUFF: 90 AEROSOL, METERED RESPIRATORY (INHALATION) at 08:56

## 2024-05-26 RX ADMIN — FOLIC ACID 1 MG: 1 TABLET ORAL at 08:47

## 2024-05-26 RX ADMIN — FAMOTIDINE 20 MG: 20 TABLET ORAL at 08:46

## 2024-05-26 RX ADMIN — FLUTICASONE FUROATE AND VILANTEROL TRIFENATATE 1 PUFF: 100; 25 POWDER RESPIRATORY (INHALATION) at 08:56

## 2024-05-26 RX ADMIN — ALBUTEROL SULFATE 2 PUFF: 90 AEROSOL, METERED RESPIRATORY (INHALATION) at 12:01

## 2024-05-26 ASSESSMENT — ACTIVITIES OF DAILY LIVING (ADL)
ADLS_ACUITY_SCORE: 19

## 2024-05-26 NOTE — DISCHARGE INSTRUCTIONS
EXAM: US ABDOMEN LIMITED  LOCATION: [facility]  DATE: 5/24/2024     INDICATION: Hx ETOH use disorder, abdominal pain, transaminitis and hyperbilirubinemia  COMPARISON: None.  TECHNIQUE: Limited abdominal ultrasound.     FINDINGS:     GALLBLADDER: Normal. No gallstones, wall thickening, or pericholecystic fluid. Negative sonographic Grullon's sign.     BILE DUCTS: No biliary dilatation. The common duct measures 3 mm.     LIVER: Increased echogenicity from diffuse fatty infiltration. Hepatomegaly, no focal mass evident.     RIGHT KIDNEY: No hydronephrosis.     PANCREAS: The visualized portions are normal.     No ascites.                                                                   IMPRESSION:  1.  Hepatomegaly with diffuse fatty infiltration of liver.  2.  Gallbladder and bile ducts normal.      EXAM: CT ABDOMEN PELVIS W CONTRAST  LOCATION: [facility]  DATE: 5/24/2024     INDICATION: Abd pain, bloating, hyperbilirubinemia and transaminitis, hx EtOH use disorder  COMPARISON: None.  TECHNIQUE: CT scan of the abdomen and pelvis was performed following injection of IV contrast. Multiplanar reformats were obtained. Dose reduction techniques were used.  CONTRAST: 74mL Isovue 370     FINDINGS:   LOWER CHEST: Calcified granuloma left lower lobe.     HEPATOBILIARY: Hepatomegaly with significant fatty infiltration of the liver.     PANCREAS: Normal.     SPLEEN: Splenomegaly.     ADRENAL GLANDS: Normal.     KIDNEYS/BLADDER: Normal.     BOWEL: Normal.    LYMPH NODES: Normal.     VASCULATURE: Normal.     PELVIC ORGANS: Normal.     MUSCULOSKELETAL: Normal.                                                                      IMPRESSION:   1.  Hepatomegaly with significant fatty infiltration of liver. Splenomegaly  2.  Normal appendix.    Component      Latest Ref Rng 5/24/2024  5:35 PM 5/24/2024  6:32 PM 5/24/2024  6:33 PM   WBC      4.0  - 11.0 10e3/uL 15.9 (H)      RBC Count      3.80 - 5.20 10e6/uL 3.79 (L)      Hemoglobin      11.7 - 15.7 g/dL 15.2      Hematocrit      35.0 - 47.0 % 43.5      MCV      78 - 100 fL 115 (H)      MCH      26.5 - 33.0 pg 40.1 (H)      MCHC      31.5 - 36.5 g/dL 34.9      RDW      10.0 - 15.0 % 14.1      Platelet Count      150 - 450 10e3/uL 351      % Neutrophils      % 82      % Lymphocytes      % 10      % Monocytes      % 5      % Eosinophils      % 1      % Basophils      % 1      % Immature Granulocytes      % 1      NRBCs per 100 WBC      <1 /100 0      Absolute Neutrophils      1.6 - 8.3 10e3/uL 13.2 (H)      Absolute Lymphocytes      0.8 - 5.3 10e3/uL 1.7      Absolute Monocytes      0.0 - 1.3 10e3/uL 0.8      Absolute Eosinophils      0.0 - 0.7 10e3/uL 0.1      Absolute Basophils      0.0 - 0.2 10e3/uL 0.1      Absolute Immature Granulocytes      <=0.4 10e3/uL 0.1      Absolute NRBCs      10e3/uL 0.0      Color Urine      Colorless, Straw, Light Yellow, Yellow   Orange !     Appearance Urine      Clear   Slightly Cloudy !     Glucose Urine      Negative mg/dL  Negative     Bilirubin Urine      Negative   Small !     Ketones Urine      Negative mg/dL  10 !     Specific Gravity Urine      1.003 - 1.035   1.031     Blood Urine      Negative   Negative     pH Urine      5.0 - 7.0   6.5     Protein Albumin Urine      Negative mg/dL  100 !     Urobilinogen mg/dL      Normal, 2.0 mg/dL  2.0     Nitrite Urine      Negative   Negative     Leukocyte Esterase Urine      Negative   Negative     Mucus Urine      None Seen /LPF  Present !     RBC Urine      <=2 /HPF  3 (H)     WBC Urine      <=5 /HPF  6 (H)     Squamous Epithelial /HPF Urine      <=1 /HPF  7 (H)     Sodium      135 - 145 mmol/L 134 (L)      Potassium      3.4 - 5.3 mmol/L 4.3      Carbon Dioxide (CO2)      22 - 29 mmol/L 24      Anion Gap      7 - 15 mmol/L 17 (H)      Urea Nitrogen      6.0 - 20.0 mg/dL 7.7      Creatinine      0.51 - 0.95 mg/dL 0.61       GFR Estimate      >60 mL/min/1.73m2 >90      Calcium      8.6 - 10.0 mg/dL 10.0      Chloride      98 - 107 mmol/L 93 (L)      Glucose      70 - 99 mg/dL 110 (H)      Alkaline Phosphatase      40 - 150 U/L 196 (H)      AST      0 - 45 U/L 277 (H)      ALT      0 - 50 U/L 100 (H)      Protein Total      6.4 - 8.3 g/dL 8.1      Albumin      3.5 - 5.2 g/dL 4.4      Bilirubin Total      <=1.2 mg/dL 2.8 (H)      Ph Venous      7.32 - 7.43       PCO2 Venous      40 - 50 mm Hg      PO2 Venous      25 - 47 mm Hg      Bicarbonate Venous      21 - 28 mmol/L      Base Excess Venous      -3.0 - 3.0 mmol/L      FIO2      Oxyhemoglobin Venous      70 - 75 %      O2 Sat, Venous      70.0 - 75.0 %      Bilirubin Direct      0.00 - 0.30 mg/dL 1.54 (H)      HCG Qual Urine      Negative   Negative  Negative    Internal QC Check POCT      Valid    Valid    POCT Kit Lot Number   [id number]    POCT Kit Expiration Date   10/30/2025    Influenza A      Negative       Influenza B      Negative       Resp Syncytial Virus      Negative       SARS CoV2 PCR      Negative       Lipase      13 - 60 U/L 35      Lactic Acid      0.7 - 2.0 mmol/L 1.6      D-Dimer Quantitative      0.00 - 0.50 ug/mL FEU 0.36      INR      0.85 - 1.15  1.02      PTT      22 - 38 Seconds 34      Magnesium      1.7 - 2.3 mg/dL 2.0      Troponin T, High Sensitivity      <=14 ng/L <6      Phosphorus      2.5 - 4.5 mg/dL 2.7      Alcohol ethyl      <=0.01 g/dL <0.01      Prealbumin      20.0 - 40.0 mg/dL 18.0 (L)      Ketone Quantitative      <=0.30 mmol/L 1.30 (H)      Procalcitonin      <0.50 ng/mL 0.32      Folate      4.6 - 34.8 ng/mL      TSH      0.30 - 4.20 uIU/mL      Vitamin B12      232 - 1,245 pg/mL      T4 Free      0.90 - 1.70 ng/dL        Component      Latest Ref Rng 5/24/2024  6:51 PM 5/25/2024  1:51 AM 5/25/2024  3:36 AM 5/25/2024  6:54 AM   WBC      4.0 - 11.0 10e3/uL    12.2 (H)    RBC Count      3.80 - 5.20 10e6/uL    3.31 (L)    Hemoglobin      11.7 -  15.7 g/dL    13.2    Hematocrit      35.0 - 47.0 %    38.9    MCV      78 - 100 fL    118 (H)    MCH      26.5 - 33.0 pg    39.9 (H)    MCHC      31.5 - 36.5 g/dL    33.9    RDW      10.0 - 15.0 %    14.3    Platelet Count      150 - 450 10e3/uL    297    % Neutrophils      %       % Lymphocytes      %       % Monocytes      %       % Eosinophils      %       % Basophils      %       % Immature Granulocytes      %       NRBCs per 100 WBC      <1 /100       Absolute Neutrophils      1.6 - 8.3 10e3/uL       Absolute Lymphocytes      0.8 - 5.3 10e3/uL       Absolute Monocytes      0.0 - 1.3 10e3/uL       Absolute Eosinophils      0.0 - 0.7 10e3/uL       Absolute Basophils      0.0 - 0.2 10e3/uL       Absolute Immature Granulocytes      <=0.4 10e3/uL       Absolute NRBCs      10e3/uL       Color Urine      Colorless, Straw, Light Yellow, Yellow        Appearance Urine      Clear        Glucose Urine      Negative mg/dL       Bilirubin Urine      Negative        Ketones Urine      Negative mg/dL       Specific Gravity Urine      1.003 - 1.035        Blood Urine      Negative        pH Urine      5.0 - 7.0        Protein Albumin Urine      Negative mg/dL       Urobilinogen mg/dL      Normal, 2.0 mg/dL       Nitrite Urine      Negative        Leukocyte Esterase Urine      Negative        Mucus Urine      None Seen /LPF       RBC Urine      <=2 /HPF       WBC Urine      <=5 /HPF       Squamous Epithelial /HPF Urine      <=1 /HPF       Sodium      135 - 145 mmol/L    136    Potassium      3.4 - 5.3 mmol/L    3.6    Carbon Dioxide (CO2)      22 - 29 mmol/L    24    Anion Gap      7 - 15 mmol/L    15    Urea Nitrogen      6.0 - 20.0 mg/dL    5.6 (L)    Creatinine      0.51 - 0.95 mg/dL    0.68    GFR Estimate      >60 mL/min/1.73m2    >90    Calcium      8.6 - 10.0 mg/dL    9.2    Chloride      98 - 107 mmol/L    97 (L)    Glucose      70 - 99 mg/dL    137 (H)    Alkaline Phosphatase      40 - 150 U/L    171 (H)    AST      0  - 45 U/L    234 (H)    ALT      0 - 50 U/L    83 (H)    Protein Total      6.4 - 8.3 g/dL    7.3    Albumin      3.5 - 5.2 g/dL    4.0    Bilirubin Total      <=1.2 mg/dL    2.8 (H)    Ph Venous      7.32 - 7.43  7.43       PCO2 Venous      40 - 50 mm Hg 42       PO2 Venous      25 - 47 mm Hg 25       Bicarbonate Venous      21 - 28 mmol/L 28       Base Excess Venous      -3.0 - 3.0 mmol/L 3.2 (H)       FIO2 96       Oxyhemoglobin Venous      70 - 75 % 41 (L)       O2 Sat, Venous      70.0 - 75.0 % 43.2 (L)       Bilirubin Direct      0.00 - 0.30 mg/dL       HCG Qual Urine      Negative        Internal QC Check POCT      Valid        POCT Kit Lot Number       POCT Kit Expiration Date       Influenza A      Negative    Negative     Influenza B      Negative    Negative     Resp Syncytial Virus      Negative    Negative     SARS CoV2 PCR      Negative    Negative     Lipase      13 - 60 U/L       Lactic Acid      0.7 - 2.0 mmol/L       D-Dimer Quantitative      0.00 - 0.50 ug/mL FEU       INR      0.85 - 1.15        PTT      22 - 38 Seconds       Magnesium      1.7 - 2.3 mg/dL    2.2    Troponin T, High Sensitivity      <=14 ng/L       Phosphorus      2.5 - 4.5 mg/dL    3.5    Alcohol ethyl      <=0.01 g/dL       Prealbumin      20.0 - 40.0 mg/dL       Ketone Quantitative      <=0.30 mmol/L    0.40 (H)    Procalcitonin      <0.50 ng/mL       Folate      4.6 - 34.8 ng/mL  >40.0 (H)      TSH      0.30 - 4.20 uIU/mL    8.43 (H)    Vitamin B12      232 - 1,245 pg/mL    781    T4 Free      0.90 - 1.70 ng/dL    1.55      Component      Latest Ref [facility] 5/26/2024  6:42 AM   WBC      4.0 - 11.0 10e3/uL    RBC Count      3.80 - 5.20 10e6/uL    Hemoglobin      11.7 - 15.7 g/dL    Hematocrit      35.0 - 47.0 %    MCV      78 - 100 fL    MCH      26.5 - 33.0 pg    MCHC      31.5 - 36.5 g/dL    RDW      10.0 - 15.0 %    Platelet Count      150 - 450 10e3/uL    % Neutrophils      %    % Lymphocytes      %    % Monocytes      %    %  Eosinophils      %    % Basophils      %    % Immature Granulocytes      %    NRBCs per 100 WBC      <1 /100    Absolute Neutrophils      1.6 - 8.3 10e3/uL    Absolute Lymphocytes      0.8 - 5.3 10e3/uL    Absolute Monocytes      0.0 - 1.3 10e3/uL    Absolute Eosinophils      0.0 - 0.7 10e3/uL    Absolute Basophils      0.0 - 0.2 10e3/uL    Absolute Immature Granulocytes      <=0.4 10e3/uL    Absolute NRBCs      10e3/uL    Color Urine      Colorless, Straw, Light Yellow, Yellow     Appearance Urine      Clear     Glucose Urine      Negative mg/dL    Bilirubin Urine      Negative     Ketones Urine      Negative mg/dL    Specific Gravity Urine      1.003 - 1.035     Blood Urine      Negative     pH Urine      5.0 - 7.0     Protein Albumin Urine      Negative mg/dL    Urobilinogen mg/dL      Normal, 2.0 mg/dL    Nitrite Urine      Negative     Leukocyte Esterase Urine      Negative     Mucus Urine      None Seen /LPF    RBC Urine      <=2 /HPF    WBC Urine      <=5 /HPF    Squamous Epithelial /HPF Urine      <=1 /HPF    Sodium      135 - 145 mmol/L 134 (L)    Potassium      3.4 - 5.3 mmol/L 4.6    Carbon Dioxide (CO2)      22 - 29 mmol/L 24    Anion Gap      7 - 15 mmol/L 12    Urea Nitrogen      6.0 - 20.0 mg/dL 5.0 (L)    Creatinine      0.51 - 0.95 mg/dL 0.65    GFR Estimate      >60 mL/min/1.73m2 >90    Calcium      8.6 - 10.0 mg/dL 9.1    Chloride      98 - 107 mmol/L 98    Glucose      70 - 99 mg/dL 98    Alkaline Phosphatase      40 - 150 U/L 154 (H)    AST      0 - 45 U/L 283 (H)    ALT      0 - 50 U/L 83 (H)    Protein Total      6.4 - 8.3 g/dL 6.7    Albumin      3.5 - 5.2 g/dL 3.5    Bilirubin Total      <=1.2 mg/dL 2.1 (H)    Ph Venous      7.32 - 7.43     PCO2 Venous      40 - 50 mm Hg    PO2 Venous      25 - 47 mm Hg    Bicarbonate Venous      21 - 28 mmol/L    Base Excess Venous      -3.0 - 3.0 mmol/L    FIO2    Oxyhemoglobin Venous      70 - 75 %    O2 Sat, Venous      70.0 - 75.0 %    Bilirubin  Direct      0.00 - 0.30 mg/dL    HCG Qual Urine      Negative     Internal QC Check POCT      Valid     POCT Kit Lot Number    POCT Kit Expiration Date    Influenza A      Negative     Influenza B      Negative     Resp Syncytial Virus      Negative     SARS CoV2 PCR      Negative     Lipase      13 - 60 U/L    Lactic Acid      0.7 - 2.0 mmol/L    D-Dimer Quantitative      0.00 - 0.50 ug/mL FEU    INR      0.85 - 1.15     PTT      22 - 38 Seconds    Magnesium      1.7 - 2.3 mg/dL    Troponin T, High Sensitivity      <=14 ng/L    Phosphorus      2.5 - 4.5 mg/dL    Alcohol ethyl      <=0.01 g/dL    Prealbumin      20.0 - 40.0 mg/dL    Ketone Quantitative      <=0.30 mmol/L    Procalcitonin      <0.50 ng/mL    Folate      4.6 - 34.8 ng/mL    TSH      0.30 - 4.20 uIU/mL    Vitamin B12      232 - 1,245 pg/mL    T4 Free      0.90 - 1.70 ng/dL       Legend:  (H) High  (L) Low  ! Abnormal

## 2024-05-26 NOTE — PROGRESS NOTES
SBP < 90, multiple rechecks- BP 86/52. Pt is asymptomatic. Scheduled clonidine held. Text page sent to [email or web address].     Response: Clonidine order placed on hold. Advised to recheck BP in an hour. Plan to give fluids if MAP < 60 or if pt becomes symptomatic.     Addendum: BP recheck 96/58.

## 2024-05-26 NOTE — PLAN OF CARE
Plan of Care Reviewed With: patient  Overall Patient Progress: no change  Outcome Evaluation: BP stable. AOx4. C/o mild abd discomfort, pt declined offered interventions. Up IND, going off unit for smoke breaks. Tolerating PO intake. PIV SL. CIWA score 2. Plan to d/c home once medically cleared.     For vital signs and complete assessments, please see documentation flowsheets.         Patient has been educated on potential risks of choosing to leave the unit and that the responsibility for patient well-being will belong to the patient. Pt has been informed that admission to hospital is due to need for medical treatment. Education given to the patient on some of the potential risks included but are not limited to:      - lack of access to nursing intervention      - possible missed appointments with MD, therapies, tests      - possible missed medications, antibiotics, management of IV's    Patient Response: agreeable    Patient notified staff prior to leaving unit: yes  Coban wrap placed over IV prior to pt leaving unit yes

## 2024-05-26 NOTE — DISCHARGE SUMMARY
[facility]  Hospitalist Discharge Summary      Date of Admission:  5/24/2024  Date of Discharge:  5/26/2024  Discharging Provider: Joe Holden MD  Discharge Service: Hospitalist Service, GOLD TEAM 21    Discharge Diagnoses   Alcohol use disorder  Alcohol withdrawals  Transaminitis  Hepatic steatosis  Abdominal pain  Hyponatremia  Hypochloremia  Alcoholic ketosis  Leukocytosis  History of syncopal episodes with convulsions    Clinically Significant Risk Factors          Follow-ups Needed After Discharge   Follow-up Appointments      -Follow up with primary care provider, within 7-14 days for hospital follow- up. The following labs/tests are recommended: CMP.      -Appointments on [place] and/or [facility] (with [facility] or [facility] provider or service). Call [phone] if you haven't heard regarding these appointments within 7 days of discharge.      Unresulted Labs Ordered in the Past 30 Days of this Admission       Date and Time Order Name Status Description    5/24/2024  7:06 PM Blood Culture Hand, Right Preliminary     5/24/2024  7:06 PM Blood Culture Arm, Left Preliminary           Discharge Disposition   Discharged to home  Condition at discharge: Stable    Hospital Course   Brief HPI:  Maxine Armas is a 34 F w/ past medical history of alcohol use disorder, prior syncope and convulsions, asthma admitted on 5/24/2024 with abdominal pain for 1 month as well as seeking detox from alcohol use. Patient notes a longstanding history of of alcohol use disorder for which she remains very functional and has been able to maintain work, driving, relationships. She notes that she and her  generally go through a 1.75 L bottle of hard alcohol in about 3 days and she feels that she is drinking the majority of this amount. She has had episodes of what her friends have described to her as seizure events with convulsion and loss of consciousness without tongue biting  or urination. These were attributed per her recollection to be syncope with convulsions. She notes having a EEG that was negative for seizure activity.      Patient is most concerned and alarmed about not being able to eat.  She notes first noticing trouble eating August 2023 which lasted for few days and since then had had a few intermittent episodes of having difficulty eating due to lack of desire and early satiety.  Since January of this year this is being a more frequent issue and has been severe over the last 1 month.  She has daily nausea and vomiting that is generally nonbloody and does not have a coffee-ground appearance.  More recently patient has been noticing new onset of numbness and tingling in the periorbital region, bilateral fingers and abdomen. She also endorses having bloating.      Hospital course:  Maxine Armas was admitted on 5/24/2024 for further evaluation of abdominal pain and for alcohol withdrawal symptoms.    Alcohol use disorder and withdrawal  Amphetamine use  Cannabis use  Drinking more than half of a 1.75L bottle of hard alcohol over the course of 3 days. Last drink 11 PM on 5/23/2024. Utox in the ED + amphetamine, and cannabinoid. RUSSELL <0.01. No  history of withdrawal seizures or DTs. MSSA currently 12 and 8. LFTs elevated as per below. hCG negative. Maddrey's score low, no indication for steroid use. Patient was started on CIWA protocol with ativan. She did not require any doses of ativan the night prior or morning of admission.  -Continue MVI, folic acid, and thiamine supplements prescribed  -Social work met with patient and offered outpatient resources for counseling and treatment of alcohol use disorder. Recommend close follow-up with primary care and consideration of naltrexone if counseling not effective on its own.     Abdominal pain and bloating  Nausea and vomiting  Transaminitis and Elevated bilirubin  Hepatic steatosis  Occurs in the setting of above EtOH use disorder  and withdrawal.  Lipase, lactic, troponin normal.  hCG negative.  LFTs are elevated in a typical EtOH pattern with elevation in both direct and total bilirubin.  Abdominal ultrasound on admission with diffuse fatty infiltrate of the liver.  Normal sonographic appearance of the bile ducts and gallbladder and no right hydronephrosis. Hepatitis A/B/C negative. Continue supportive management for EtOH use disorder. Encourage sobriety.  -Recommend rechecking BMP at follow-up  -Continue previously prescribed omeprazole 20 mg once daily for 30 days    Depression and anxiety  Serve as triggers for alcohol use. Patient would benefit from antidepressant therapy as outpatient, consider use of bupropion - ideally would start when further away from withdrawal symptoms.  -Start hydroxyzine for anxiety     Trace hyponatremia and hypochloremia  Suspect may be a combination of GI as well as urinary light losses.     Anion gap metabolic acidosis  Alcoholic ketosis  Suspect is secondary to acute EtOH toxicity.  Admission ketones 1.3, AG 17     Leukocytosis with left shift  WBC 15.9 on admission.  UA is not consistent with clean-catch.  Given amphetamine use this could certainly produce a leukocytosis. No signs of infection.     Macrocytosis  Normal Hgb with MCV of 115.  Suspect is secondary to EtOH use. Folate and B12 normal.    Syncopal episodes with convulsions  In the past, has been ruled out for epilepsy and not associated with alcohol withdrawal, no neuro notes available    Tobacco use  Smokes 1 pack/day-nicotine replacement and encourage cessation    Consultations This Hospital Stay   CHEMICAL DEPENDENCY IP CONSULT  SOCIAL WORK IP CONSULT    Code Status   Full Code    Time Spent on this Encounter   I, Joe Holden MD, personally saw the patient today and spent greater than 30 minutes discharging this patient.       Joe Holden MD  [facility] MED SURG ORTHOPEDIC  [address] [place] [address]  [id number]  Phone: [phone]  Fax: [phone]  ______________________________________________________________________    Physical Exam   Vital Signs: Temp: 98.4  F (36.9  C) Temp src: Oral BP: 90/62 Pulse: 71   Resp: 16 SpO2: 98 % O2 Device: None (Room air)    Weight: 149 lbs 14.6 oz    General: appears comfortable, in no acute distress  HEENT: anicteric sclera, moist mucous membranes  Cardiovascular: regular rate and rhythm, no murmurs, 2+ distal pulses equal bilaterally  Respiratory: no increased work of breathing, lungs clear to auscultation throughout, no wheezing or crackles  Abdomen: soft, mild distension throughout, non-tender  Extremities: No appreciable edema  Skin: no concerning rashes or lesions  Neuro: A&O x4, responds appropriately to exam    Primary Care Physician   Titus Juarez    Discharge Orders      Reason for your hospital stay    You were hospitalized due to elevated liver enzymes and alcohol withdrawal symptoms. Your liver disease is likely due to alcohol use. Do what you can and utilize resources provided by social work to decrease the amount of drinking. Continue taking vitamins. Ok to use hydroxyzine as needed for anxiety.     Activity    Your activity upon discharge: activity as tolerated     Adult [facility] Follow-up and recommended labs and tests    Follow up with primary care provider, within 7-14 days for hospital follow- up.  The following labs/tests are recommended: CMP.      Appointments on [facility] and/or [facility] (with [facility] or [facility] provider or service). Call [phone] if you haven't heard regarding these appointments within 7 days of discharge.     Diet    Follow this diet upon discharge: Regular Diet Adult, include more protein in your diet       Significant Results and Procedures   Most Recent 3 CBC's:  Recent Labs   Lab Test 05/25/24  0654 05/24/24  1735   WBC 12.2* 15.9*   HGB 13.2 15.2   * 115*    351     Most Recent 3 BMP's:  Recent Labs   Lab  Test 05/26/24  0642 05/25/24  0654 05/24/24  1735   * 136 134*   POTASSIUM 4.6 3.6 4.3   CHLORIDE 98 97* 93*   CO2 24 24 24   BUN 5.0* 5.6* 7.7   CR 0.65 0.68 0.61   ANIONGAP 12 15 17*   KAN 9.1 9.2 10.0   GLC 98 137* 110*     Most Recent 2 LFT's:  Recent Labs   Lab Test 05/26/24  0642 05/25/24  0654   * 234*   ALT 83* 83*   ALKPHOS 154* 171*   BILITOTAL 2.1* 2.8*     Most Recent 3 INR's:  Recent Labs   Lab Test 05/24/24  1735   INR 1.02   ,   Results for orders placed or performed during the hospital encounter of 05/24/24   US Abdomen Limited    Narrative    EXAM: US ABDOMEN LIMITED  LOCATION: [facility]  DATE: 5/24/2024    INDICATION: Hx ETOH use disorder, abdominal pain, transaminitis and hyperbilirubinemia  COMPARISON: None.  TECHNIQUE: Limited abdominal ultrasound.    FINDINGS:    GALLBLADDER: Normal. No gallstones, wall thickening, or pericholecystic fluid. Negative sonographic Grullon's sign.    BILE DUCTS: No biliary dilatation. The common duct measures 3 mm.    LIVER: Increased echogenicity from diffuse fatty infiltration. Hepatomegaly, no focal mass evident.    RIGHT KIDNEY: No hydronephrosis.    PANCREAS: The visualized portions are normal.    No ascites.      Impression    IMPRESSION:  1.  Hepatomegaly with diffuse fatty infiltration of liver.  2.  Gallbladder and bile ducts normal.       CT Abdomen Pelvis w Contrast    Narrative    EXAM: CT ABDOMEN PELVIS W CONTRAST  LOCATION: [facility]  DATE: 5/24/2024    INDICATION: Abd pain, bloating, hyperbilirubinemia and transaminitis, hx EtOH use disorder  COMPARISON: None.  TECHNIQUE: CT scan of the abdomen and pelvis was performed following injection of IV contrast. Multiplanar reformats were obtained. Dose reduction techniques were used.  CONTRAST: 74mL Isovue 370    FINDINGS:   LOWER CHEST: Calcified granuloma left lower lobe.    HEPATOBILIARY:  Hepatomegaly with significant fatty infiltration of the liver.    PANCREAS: Normal.    SPLEEN: Splenomegaly.    ADRENAL GLANDS: Normal.    KIDNEYS/BLADDER: Normal.    BOWEL: Normal.    LYMPH NODES: Normal.    VASCULATURE: Normal.    PELVIC ORGANS: Normal.    MUSCULOSKELETAL: Normal.      Impression    IMPRESSION:   1.  Hepatomegaly with significant fatty infiltration of liver. Splenomegaly    2.  Normal appendix.       Discharge Medications   Current Discharge Medication List        START taking these medications    Details   folic acid (FOLVITE) 1 MG tablet Take 1 tablet (1 mg) by mouth daily  Qty: 90 tablet, Refills: 3    Associated Diagnoses: Severe alcohol use disorder (H)      hydrOXYzine HCl (ATARAX) 25 MG tablet Take 1 tablet (25 mg) by mouth 3 times daily as needed for anxiety  Qty: 60 tablet, Refills: 3    Associated Diagnoses: Anxiety      multivitamin w/minerals (THERA-VIT-M) tablet Take 1 tablet by mouth daily  Qty: 90 tablet, Refills: 3    Associated Diagnoses: Severe alcohol use disorder (H)      thiamine (B-1) 100 MG tablet Take 1 tablet (100 mg) by mouth daily  Qty: 90 tablet, Refills: 3    Associated Diagnoses: Severe alcohol use disorder (H)           CONTINUE these medications which have NOT CHANGED    Details   albuterol (PROAIR HFA/PROVENTIL HFA/VENTOLIN HFA) 108 (90 Base) MCG/ACT inhaler Inhale 2 puffs into the lungs 4 times daily      omeprazole (PRILOSEC) 20 MG DR capsule Take 1 capsule (20 mg) by mouth daily      WIXELA INHUB 250-50 MCG/ACT inhaler Inhale 1 puff into the lungs 2 times daily           Allergies   No Known Allergies

## 2024-05-26 NOTE — CONSULTS
Social Work Progress Note    Social work asked by hospitalist to meet with patient and provide resources. SW met with patient at bedside and provided her with an obs letter as she had been switched to observation status as of yesterday. Briefly explained and told patient to call her insurance company if she had any questions. Let patient know that our chemical dependency assessors do not work on the weekend and would not be back in office till Tuesday. Asked patient if she was interested in any CD resources. She did not feel she needed them. SW had already printed general CD resources and offered to patient. She did take them. There are no additional care management needs at this time. Patient will discharge to home today with her .     HERMELINDO Hearn, LGSW  5 Med Surg   [facility]  Phone: [phone]  Pager: [phone]

## 2024-05-26 NOTE — PLAN OF CARE
Goal Outcome Evaluation:      Plan of Care Reviewed With: patient    Overall Patient Progress: improving    Outcome Evaluation: Pt discharging this shift    Pt A&Ox4, denies SOB, CP, nausea, vomiting, diarrhea, and constipation. Pt endorses N/T in fingers, toes, sternum to knees. Pt endorses pain 1/10, but said no interventions needed. CIWA score of 2. VSS on RA, BP 90s but stable. Independent. Continent of B&B, LBM 5/25. IV removed this shift. Seizure precautions maintained, seizure pads on bed.    Pt discharged at 1219 to home, and left with personal belongings. Pt received complete discharge paperwork. Pt was given times of last dose for all discharge medications on discharge medication sheets. Pt to follow up with PCP in 7-14 days. Pt had no further questions at the time of discharge and no unmet needs were identified.

## 2024-05-26 NOTE — PROGRESS NOTES
Cross cover    Paged by RN Gaona'ad that patient's BP was in the 80s systolically and MAPs in the low 60s. Chart reviewed. Patient is on clonidine but not needing much lorazepam so I wonder if withdrawals haven't started yet. In light of this will hold ativan, recheck BP in 1hour and if MAP is <60, will order a fluid bolus. This was communicated directly to RN.     Vinod Fernandez MD

## 2024-05-26 NOTE — PLAN OF CARE
Goal Outcome Evaluation:      Plan of Care Reviewed With: patient    Overall Patient Progress: improvingOverall Patient Progress: improving           VS: Temp: 98  F (36.7  C) Temp src: Oral BP: (!) 86/52 Pulse: 75   Resp: 16 SpO2: 97 % O2 Device: None (Room air)       O2: SpO2>90% on room air, denies SOB and CP   Output: Voiding without difficulty in bathroom   Last BM: 05/25/2024   Activity: Up ad alexandr, independent with steady gait   Skin: BUE bruising, otherwise intact   Pain: 2/10 abdominal pain, declined intervention   CMS: A&O x4, denies N/T   Dressing: PIV dressing CDI   Diet: Regular diet, denies nausea currently   LDA: R PIV SL and wrapped with coban   Equipment: IV pole, personal belongings   Plan: CIWA scoring, monitor for withdrawal s&s, discharge TBD   Additional Info: Pt goes out to smoke, lets staff know when she leaves floor     Patient has been educated on potential risks of choosing to leave the unit and that the responsibility for patient well-being will belong to the patient. Pt has been informed that admission to hospital is due to need for medical treatment. Education given to the patient on some of the potential risks included but are not limited to:      - lack of access to nursing intervention      - possible missed appointments with MD, therapies, tests      - possible missed medications, antibiotics, management of IV's    Patient Response:agreeable    Patient notified staff prior to leaving unit: yes  Coban wrap placed over IV prior to pt leaving unit yes

## 2024-05-29 LAB
BACTERIA BLD CULT: NO GROWTH
BACTERIA BLD CULT: NO GROWTH

## 2024-05-30 NOTE — RESULT ENCOUNTER NOTE
Result(s) reviewed by SOC triage MD. Blood cultures collected 5/24/2024 are no growth (FINAL). No further action needed at this time.

## 2024-07-14 ENCOUNTER — HEALTH MAINTENANCE LETTER (OUTPATIENT)
Age: 34
End: 2024-07-14

## 2025-07-19 ENCOUNTER — HEALTH MAINTENANCE LETTER (OUTPATIENT)
Age: 35
End: 2025-07-19